# Patient Record
Sex: MALE | Race: WHITE | NOT HISPANIC OR LATINO | Employment: FULL TIME | URBAN - METROPOLITAN AREA
[De-identification: names, ages, dates, MRNs, and addresses within clinical notes are randomized per-mention and may not be internally consistent; named-entity substitution may affect disease eponyms.]

---

## 2018-07-13 ENCOUNTER — OFFICE VISIT (OUTPATIENT)
Dept: INTERNAL MEDICINE | Facility: CLINIC | Age: 33
End: 2018-07-13
Payer: COMMERCIAL

## 2018-07-13 ENCOUNTER — LAB VISIT (OUTPATIENT)
Dept: LAB | Facility: HOSPITAL | Age: 33
End: 2018-07-13
Attending: INTERNAL MEDICINE
Payer: COMMERCIAL

## 2018-07-13 VITALS
SYSTOLIC BLOOD PRESSURE: 108 MMHG | RESPIRATION RATE: 16 BRPM | WEIGHT: 232.13 LBS | DIASTOLIC BLOOD PRESSURE: 79 MMHG | TEMPERATURE: 99 F | HEIGHT: 68 IN | HEART RATE: 113 BPM | BODY MASS INDEX: 35.18 KG/M2

## 2018-07-13 DIAGNOSIS — K21.9 GASTROESOPHAGEAL REFLUX DISEASE, ESOPHAGITIS PRESENCE NOT SPECIFIED: ICD-10-CM

## 2018-07-13 DIAGNOSIS — R11.0 NAUSEA: ICD-10-CM

## 2018-07-13 DIAGNOSIS — K57.30 DIVERTICULOSIS OF LARGE INTESTINE WITHOUT HEMORRHAGE: ICD-10-CM

## 2018-07-13 DIAGNOSIS — F11.90 UNCOMPLICATED OPIOID USE: ICD-10-CM

## 2018-07-13 DIAGNOSIS — K52.9 ACUTE GASTROENTERITIS: Primary | ICD-10-CM

## 2018-07-13 DIAGNOSIS — R51.9 NONINTRACTABLE EPISODIC HEADACHE, UNSPECIFIED HEADACHE TYPE: ICD-10-CM

## 2018-07-13 LAB
ALBUMIN SERPL BCP-MCNC: 3.8 G/DL
ALP SERPL-CCNC: 80 U/L
ALT SERPL W/O P-5'-P-CCNC: 17 U/L
ANION GAP SERPL CALC-SCNC: 10 MMOL/L
ANISOCYTOSIS BLD QL SMEAR: SLIGHT
AST SERPL-CCNC: 12 U/L
BASOPHILS # BLD AUTO: 0.04 K/UL
BASOPHILS NFR BLD: 0.2 %
BILIRUB SERPL-MCNC: 0.7 MG/DL
BUN SERPL-MCNC: 13 MG/DL
CALCIUM SERPL-MCNC: 9.8 MG/DL
CHLORIDE SERPL-SCNC: 99 MMOL/L
CO2 SERPL-SCNC: 26 MMOL/L
CREAT SERPL-MCNC: 1 MG/DL
DIFFERENTIAL METHOD: ABNORMAL
EOSINOPHIL # BLD AUTO: 0 K/UL
EOSINOPHIL NFR BLD: 0.1 %
ERYTHROCYTE [DISTWIDTH] IN BLOOD BY AUTOMATED COUNT: 12.9 %
EST. GFR  (AFRICAN AMERICAN): >60 ML/MIN/1.73 M^2
EST. GFR  (NON AFRICAN AMERICAN): >60 ML/MIN/1.73 M^2
GIANT PLATELETS BLD QL SMEAR: PRESENT
GLUCOSE SERPL-MCNC: 124 MG/DL
HCT VFR BLD AUTO: 37.4 %
HGB BLD-MCNC: 11.9 G/DL
IMM GRANULOCYTES # BLD AUTO: 0.11 K/UL
IMM GRANULOCYTES NFR BLD AUTO: 0.6 %
LYMPHOCYTES # BLD AUTO: 1.7 K/UL
LYMPHOCYTES NFR BLD: 9.1 %
MCH RBC QN AUTO: 27.4 PG
MCHC RBC AUTO-ENTMCNC: 31.8 G/DL
MCV RBC AUTO: 86 FL
MONOCYTES # BLD AUTO: 2.1 K/UL
MONOCYTES NFR BLD: 11.7 %
NEUTROPHILS # BLD AUTO: 14.3 K/UL
NEUTROPHILS NFR BLD: 78.3 %
NRBC BLD-RTO: 0 /100 WBC
OVALOCYTES BLD QL SMEAR: ABNORMAL
PLATELET # BLD AUTO: 186 K/UL
PLATELET BLD QL SMEAR: ABNORMAL
PMV BLD AUTO: 9.8 FL
POIKILOCYTOSIS BLD QL SMEAR: SLIGHT
POLYCHROMASIA BLD QL SMEAR: ABNORMAL
POTASSIUM SERPL-SCNC: 4.3 MMOL/L
PROT SERPL-MCNC: 8 G/DL
RBC # BLD AUTO: 4.35 M/UL
SODIUM SERPL-SCNC: 135 MMOL/L
WBC # BLD AUTO: 18.29 K/UL

## 2018-07-13 PROCEDURE — 36415 COLL VENOUS BLD VENIPUNCTURE: CPT | Mod: PO

## 2018-07-13 PROCEDURE — 80053 COMPREHEN METABOLIC PANEL: CPT

## 2018-07-13 PROCEDURE — 3008F BODY MASS INDEX DOCD: CPT | Mod: CPTII,S$GLB,, | Performed by: INTERNAL MEDICINE

## 2018-07-13 PROCEDURE — 85025 COMPLETE CBC W/AUTO DIFF WBC: CPT

## 2018-07-13 PROCEDURE — 80307 DRUG TEST PRSMV CHEM ANLYZR: CPT

## 2018-07-13 PROCEDURE — 99999 PR PBB SHADOW E&M-EST. PATIENT-LVL III: CPT | Mod: PBBFAC,,, | Performed by: INTERNAL MEDICINE

## 2018-07-13 PROCEDURE — 99214 OFFICE O/P EST MOD 30 MIN: CPT | Mod: S$GLB,,, | Performed by: INTERNAL MEDICINE

## 2018-07-13 RX ORDER — ONDANSETRON 8 MG/1
8 TABLET, ORALLY DISINTEGRATING ORAL EVERY 12 HOURS PRN
Qty: 12 TABLET | Refills: 0 | Status: SHIPPED | OUTPATIENT
Start: 2018-07-13 | End: 2018-08-06

## 2018-07-13 RX ORDER — OMEPRAZOLE 20 MG/1
20 TABLET, DELAYED RELEASE ORAL DAILY
Qty: 30 TABLET | Refills: 0
Start: 2018-07-13 | End: 2019-07-13

## 2018-07-13 NOTE — PROGRESS NOTES
Subjective:       Patient ID: Luca Story is a 32 y.o. male who presents for Headache; Nausea; and Gastroesophageal Reflux  He is present with girlfriend.      Nausea   This is a new problem. The current episode started in the past 7 days (started 3 days ago). The problem occurs constantly. The problem has been gradually improving. Associated symptoms include diaphoresis (night), headaches, nausea and vomiting. Pertinent negatives include no abdominal pain, arthralgias, chest pain, chills, congestion, coughing, fatigue, fever, myalgias, rash, sore throat, swollen glands or weakness. Nothing aggravates the symptoms.   Headache    This is a new problem. The current episode started in the past 7 days. The problem occurs intermittently. The problem has been resolved. The pain is located in the occipital region. The pain does not radiate. The quality of the pain is described as aching. The patient is experiencing no pain. Associated symptoms include nausea and vomiting. Pertinent negatives include no abdominal pain, coughing, dizziness, eye redness, fever, loss of balance, phonophobia, photophobia, rhinorrhea, scalp tenderness, sinus pressure, sore throat, swollen glands, tingling or weakness. Nothing aggravates the symptoms. He has tried oral narcotics for the symptoms. The treatment provided mild relief. There is no history of hypertension or recent head traumas.   Gastroesophageal Reflux   He complains of early satiety, heartburn and nausea. He reports no abdominal pain, no chest pain, no coughing, no dysphagia or no sore throat. This is a recurrent problem. The current episode started more than 1 month ago. The problem occurs frequently. The problem has been gradually worsening. The heartburn wakes him from sleep. Nothing aggravates the symptoms. Pertinent negatives include no fatigue, muscle weakness or orthopnea. He has tried nothing for the symptoms. Past procedures do not include an abdominal ultrasound or  a UGI.     Patient reports history of recurrent diverticulitis and has been treating what he believed was a recurrence with cipro and flagyl. He would take these for a few days and would stop when he felt better. Two of his past episodes were evaluated with CT abdomen in 2012 that showed 7-8cm area of inflammatory changes in the colon with diverticula. Patient does not recall ever having a colonoscopy.    Patient's girlfriend is also concerned that patient may be using the opioids that she found. He reports taking it for flare-ups of diverticulitis but says he last took it 2 weeks ago.      Review of Systems   Constitutional: Positive for appetite change (decreased since onset of symptoms) and diaphoresis (night). Negative for chills, fatigue, fever and unexpected weight change.   HENT: Negative for congestion, rhinorrhea, sinus pressure and sore throat.    Eyes: Negative for photophobia, redness and visual disturbance.   Respiratory: Negative for cough and shortness of breath.    Cardiovascular: Negative for chest pain, palpitations and leg swelling.   Gastrointestinal: Positive for heartburn, nausea and vomiting. Negative for abdominal pain, blood in stool, constipation, diarrhea and dysphagia.   Genitourinary: Negative for decreased urine volume, dysuria and hematuria.   Musculoskeletal: Negative for arthralgias, myalgias and muscle weakness.   Skin: Negative for rash.   Neurological: Positive for headaches. Negative for dizziness, tingling, weakness, light-headedness and loss of balance.   Hematological: Negative for adenopathy.       Objective:      Physical Exam   Constitutional: He is oriented to person, place, and time. He appears well-developed and well-nourished.   HENT:   Head: Normocephalic and atraumatic.   Right Ear: Hearing and external ear normal.   Left Ear: Hearing and external ear normal.   Nose: Nose normal.   Mouth/Throat: Uvula is midline and oropharynx is clear and moist.   Eyes: Conjunctivae,  EOM and lids are normal. Pupils are equal, round, and reactive to light.   Neck: Full passive range of motion without pain.   Cardiovascular: Normal rate and regular rhythm.    No murmur heard.  Pulmonary/Chest: Effort normal and breath sounds normal. He has no wheezes.   Abdominal: Soft. Bowel sounds are normal. He exhibits no distension. There is no tenderness.   Musculoskeletal: Normal range of motion.   Neurological: He is alert and oriented to person, place, and time.   Skin: Skin is warm and dry.   Psychiatric: He has a normal mood and affect.       Assessment:       1. Acute gastroenteritis    2. Gastroesophageal reflux disease, esophagitis presence not specified    3. Diverticulosis of large intestine without hemorrhage    4. Nausea    5. Nonintractable episodic headache, unspecified headache type    6. Uncomplicated opioid use        Plan:       1. Acute gastroenteritis  - trial of zofran as needed for nausea  - bland diet, increase fluid intake, advance diet slowly    2. Gastroesophageal reflux disease, esophagitis presence not specified  - omeprazole (PRILOSEC OTC) 20 MG tablet; Take 1 tablet (20 mg total) by mouth once daily.  Dispense: 30 tablet; Refill: 0  - Ambulatory Referral to Gastroenterology  - CBC auto differential; Future  - Comprehensive metabolic panel; Future    3. Diverticulosis of large intestine without hemorrhage  - Ambulatory Referral to Gastroenterology    4. Nausea  - ondansetron (ZOFRAN-ODT) 8 MG TbDL; Take 1 tablet (8 mg total) by mouth every 12 (twelve) hours as needed.  Dispense: 12 tablet; Refill: 0  - Comprehensive metabolic panel; Future    5. Nonintractable episodic headache, unspecified headache type  - may use tylenol tid as needed for headache    6. Uncomplicated opioid use  - DRUGS OF ABUSE SCREEN, BLOOD; Future    RTC for annual exam    Shaina Walters MD

## 2018-07-16 ENCOUNTER — TELEPHONE (OUTPATIENT)
Dept: GASTROENTEROLOGY | Facility: CLINIC | Age: 33
End: 2018-07-16

## 2018-07-16 ENCOUNTER — LAB VISIT (OUTPATIENT)
Dept: LAB | Facility: HOSPITAL | Age: 33
End: 2018-07-16
Attending: INTERNAL MEDICINE
Payer: COMMERCIAL

## 2018-07-16 ENCOUNTER — TELEPHONE (OUTPATIENT)
Dept: ENDOSCOPY | Facility: HOSPITAL | Age: 33
End: 2018-07-16

## 2018-07-16 ENCOUNTER — OFFICE VISIT (OUTPATIENT)
Dept: GASTROENTEROLOGY | Facility: CLINIC | Age: 33
End: 2018-07-16
Payer: COMMERCIAL

## 2018-07-16 VITALS
WEIGHT: 225.06 LBS | HEART RATE: 101 BPM | SYSTOLIC BLOOD PRESSURE: 115 MMHG | HEIGHT: 68 IN | BODY MASS INDEX: 34.11 KG/M2 | DIASTOLIC BLOOD PRESSURE: 78 MMHG

## 2018-07-16 DIAGNOSIS — R10.32 LLQ PAIN: ICD-10-CM

## 2018-07-16 DIAGNOSIS — D63.8 ANEMIA, CHRONIC DISEASE: ICD-10-CM

## 2018-07-16 DIAGNOSIS — K57.92 DIVERTICULITIS: ICD-10-CM

## 2018-07-16 DIAGNOSIS — Z12.11 SPECIAL SCREENING FOR MALIGNANT NEOPLASMS, COLON: Primary | ICD-10-CM

## 2018-07-16 DIAGNOSIS — K21.9 GASTROESOPHAGEAL REFLUX DISEASE, ESOPHAGITIS PRESENCE NOT SPECIFIED: Primary | ICD-10-CM

## 2018-07-16 PROBLEM — D64.9 ANEMIA: Status: ACTIVE | Noted: 2018-07-16

## 2018-07-16 PROBLEM — D72.829 LEUKOCYTOSIS: Status: ACTIVE | Noted: 2018-07-16

## 2018-07-16 LAB
AMPHETAMINES SERPL QL: NEGATIVE
BARBITURATES SERPL QL SCN: NEGATIVE
BENZODIAZ SERPL QL: NEGATIVE
CANNABINOIDS SERPL QL: POSITIVE
COCAINE, BLOOD: NEGATIVE
CRP SERPL-MCNC: 348.2 MG/L
ETHANOL SERPL-MCNC: NEGATIVE MG/DL
FERRITIN SERPL-MCNC: 607 NG/ML
IGA SERPL-MCNC: 180 MG/DL
IGG SERPL-MCNC: 979 MG/DL
IGM SERPL-MCNC: 82 MG/DL
IRON SERPL-MCNC: 12 UG/DL
METHADONE SERPL QL SCN: NEGATIVE
OPIATES SERPL QL SCN: NEGATIVE
PCP SERPL QL SCN: NEGATIVE
PROPOXYPH SERPL QL: NEGATIVE
SATURATED IRON: 4 %
TOTAL IRON BINDING CAPACITY: 296 UG/DL
TRANSFERRIN SERPL-MCNC: 200 MG/DL

## 2018-07-16 PROCEDURE — 83540 ASSAY OF IRON: CPT

## 2018-07-16 PROCEDURE — 99204 OFFICE O/P NEW MOD 45 MIN: CPT | Mod: S$GLB,,, | Performed by: INTERNAL MEDICINE

## 2018-07-16 PROCEDURE — 83516 IMMUNOASSAY NONANTIBODY: CPT

## 2018-07-16 PROCEDURE — 99999 PR PBB SHADOW E&M-EST. PATIENT-LVL III: CPT | Mod: PBBFAC,,, | Performed by: INTERNAL MEDICINE

## 2018-07-16 PROCEDURE — 3008F BODY MASS INDEX DOCD: CPT | Mod: CPTII,S$GLB,, | Performed by: INTERNAL MEDICINE

## 2018-07-16 PROCEDURE — 86140 C-REACTIVE PROTEIN: CPT

## 2018-07-16 PROCEDURE — 82784 ASSAY IGA/IGD/IGG/IGM EACH: CPT

## 2018-07-16 PROCEDURE — 82728 ASSAY OF FERRITIN: CPT

## 2018-07-16 RX ORDER — POLYETHYLENE GLYCOL 3350, SODIUM SULFATE ANHYDROUS, SODIUM BICARBONATE, SODIUM CHLORIDE, POTASSIUM CHLORIDE 236; 22.74; 6.74; 5.86; 2.97 G/4L; G/4L; G/4L; G/4L; G/4L
4 POWDER, FOR SOLUTION ORAL ONCE
Qty: 4000 ML | Refills: 0 | Status: SHIPPED | OUTPATIENT
Start: 2018-07-16 | End: 2018-07-16

## 2018-07-16 NOTE — PROGRESS NOTES
Subjective:       Patient ID: Luca Story is a 32 y.o. male.    Chief Complaint: GI Problem    HPI  Review of Systems   Constitutional: Negative for activity change, appetite change, chills, diaphoresis, fatigue, fever and unexpected weight change.   HENT: Positive for voice change. Negative for congestion, ear pain, mouth sores, rhinorrhea, sinus pressure, sneezing, sore throat and trouble swallowing.    Eyes: Negative for pain.   Respiratory: Negative for cough and shortness of breath.    Cardiovascular: Negative for chest pain, palpitations and leg swelling.   Genitourinary: Negative for difficulty urinating and flank pain.   Musculoskeletal: Positive for myalgias. Negative for arthralgias, back pain, gait problem, joint swelling and neck pain.   Skin: Negative for rash.   Neurological: Negative for dizziness, tremors, syncope, numbness and headaches.   Hematological: Negative for adenopathy. Does not bruise/bleed easily.   Psychiatric/Behavioral: Negative for agitation, behavioral problems, confusion, decreased concentration and dysphoric mood.       Objective:      Physical Exam   Constitutional: He is oriented to person, place, and time. He appears well-developed and well-nourished. No distress.   HENT:   Right Ear: External ear normal.   Left Ear: External ear normal.   Nose: Nose normal.   Mouth/Throat: Oropharynx is clear and moist. No oropharyngeal exudate.   Eyes: Conjunctivae are normal. Pupils are equal, round, and reactive to light. No scleral icterus.   Neck: Normal range of motion. Neck supple. No thyromegaly present.   Cardiovascular: Normal rate, normal heart sounds and intact distal pulses.  Exam reveals no gallop.    No murmur heard.  Pulmonary/Chest: Effort normal and breath sounds normal. He has no wheezes.   Abdominal: Soft. Normal appearance and bowel sounds are normal. He exhibits no distension, no fluid wave, no ascites and no mass. There is no hepatosplenomegaly. There is no  "tenderness. There is no rigidity, no rebound, no guarding and no CVA tenderness.   Genitourinary:   Genitourinary Comments: recent   Musculoskeletal: Normal range of motion. He exhibits no edema or tenderness.   Lymphadenopathy:     He has no cervical adenopathy.   Neurological: He is alert and oriented to person, place, and time. He has normal reflexes. No cranial nerve deficit.   Skin: Skin is warm. No rash noted. He is not diaphoretic.   Psychiatric: He has a normal mood and affect. His behavior is normal. Thought content normal.       Assessment:       1. Gastroesophageal reflux disease, esophagitis presence not specified    2. Diverticulitis    3. Anemia, chronic disease        Plan:         HISTORY OF PRESENT ILLNESS:  This is a new patient visit for [facility].  He is here   with his girlfriend.  He has a complicated history.  He had an initial   hospitalization for diverticulitis in 2012.  He would have been about 25 or 26   years of age at that time.  He did have a microperforation.  He required 4 days   in the hospital.  He was treated with antibiotics.  He had another recurrence   later on that year, was treated as an outpatient with antibiotics.  Since that   time, he has had occasional "flare-ups."  This might happen 1 to 2 times a year.    He will experience low mid abdominal pain reminiscent of the diverticulitis.    He will take 2 days of the Cipro and Flagyl that are left over and then the   symptoms will resolve.  His bowel movements are usually normal.  Normal for Hector   is 2 to 3 soft formed stools per day.  He denies any melena or hematochezia.    He also reports "terrible" acid reflux.  This has been present since childhood.    He describes both pyrosis and regurgitation.  He particularly has problems with   nocturnal regurgitation.  He denies any dysphagia.  Prilosec helps when he   takes it and Tums gives him partial and temporary relief as well.    Recently, he had some lab work that showed a " leukocytosis with a WBC of 18 and   an anemia with normocytic hemoglobin of 11.9.    ASSESSMENT AND DECISION MAKIN.  Gastroesophageal reflux.  He has chronic longstanding symptoms.  I   emphasized the importance of compliance with the Prilosec.  I have recommended   we do an EGD to assess for any sign of esophageal damage.  2.  History of diverticulitis.  He has had 1 documented episode of   diverticulitis, seems like he has other flares of disease.  With his anemia, I   think it is very important for us to do a colonoscopy now to make sure there is   no IBD.  3.  Anemia, etiology of this is unclear.  I recommended some additional work to   include CRP, ferritin, ____, TIBC and TTG.    RECOMMENDATIONS:  1.  Labs.  2.  Set up EGD and colon.  3.  Take Prilosec everyday, take from there.      CONNOR/IN  dd: 2018 09:03:10 (CDT)  td: 2018 20:23:11 (CDT)  Doc ID   #[id number]  Job ID #[id number]    CC:

## 2018-07-16 NOTE — TELEPHONE ENCOUNTER
Patient aware and expressed understanding.  Scheduled for CT on Thursday 7/19 for 4:45.  Verbal instructions given to patient and he expressed understanding.

## 2018-07-16 NOTE — TELEPHONE ENCOUNTER
----- Message from Maria Luisa Easley sent at 7/16/2018  4:03 PM CDT -----  Contact: Self- [phone]  Jose- pt is returning a missed call from Diana in regards lab results and scheduling a CT scan- please contact pt at [phone]

## 2018-07-16 NOTE — TELEPHONE ENCOUNTER
----- Message from Ra Garcia MD sent at 7/16/2018 11:09 AM CDT -----  Please call pt, his inflammation test is really  High  Before we do the scope, I want to get a CT scan to make sure no diverticulitis going on now

## 2018-07-16 NOTE — LETTER
July 16, 2018      Shaina Walters MD  [address] [place] [address] [place] [address]           [facility]  [address]  [facility] [address]  Phone: [phone]  Fax: [phone]          Patient: Luca Story   MR Number: [id number]   YOB: 1985   Date of Visit: 7/16/2018       Dear Dr. Shaina Walters:    Thank you for referring Luca Story to me for evaluation. Attached you will find relevant portions of my assessment and plan of care.    If you have questions, please do not hesitate to call me. I look forward to following Luca Story along with you.    Sincerely,    Ra Garcia MD    Enclosure  CC:  No Recipients    If you would like to receive this communication electronically, please contact [email or web address] or [phone] to request more information on [facility] Link access.    For providers and/or their staff who would like to refer a patient to [facility], please contact us through our one-stop-shop provider referral line, [facility], at [phone].    If you feel you have received this communication in error or would no longer like to receive these types of communications, please e-mail [email or web address]

## 2018-07-16 NOTE — TELEPHONE ENCOUNTER
----- Message from Yolie Butler sent at 7/16/2018  4:29 PM CDT -----  Contact: self - [phone]  Jose - returning your call - please call patient at

## 2018-07-17 LAB — TTG IGA SER IA-ACNC: 6 UNITS

## 2018-07-19 ENCOUNTER — HOSPITAL ENCOUNTER (OUTPATIENT)
Dept: RADIOLOGY | Facility: HOSPITAL | Age: 33
Discharge: HOME | End: 2018-07-19
Attending: INTERNAL MEDICINE
Payer: COMMERCIAL

## 2018-07-19 ENCOUNTER — HOSPITAL ENCOUNTER (EMERGENCY)
Facility: HOSPITAL | Age: 33
Discharge: HOME | End: 2018-07-19
Attending: EMERGENCY MEDICINE
Payer: COMMERCIAL

## 2018-07-19 VITALS
SYSTOLIC BLOOD PRESSURE: 126 MMHG | RESPIRATION RATE: 18 BRPM | WEIGHT: 224.88 LBS | TEMPERATURE: 100 F | OXYGEN SATURATION: 98 % | HEIGHT: 68 IN | BODY MASS INDEX: 34.08 KG/M2 | DIASTOLIC BLOOD PRESSURE: 68 MMHG | HEART RATE: 105 BPM

## 2018-07-19 DIAGNOSIS — G44.209 TENSION HEADACHE: Primary | ICD-10-CM

## 2018-07-19 DIAGNOSIS — R10.32 LLQ PAIN: ICD-10-CM

## 2018-07-19 DIAGNOSIS — D72.821 MONOCYTOSIS: ICD-10-CM

## 2018-07-19 LAB
ALBUMIN SERPL BCP-MCNC: 2.6 G/DL
ALP SERPL-CCNC: 147 U/L
ALT SERPL W/O P-5'-P-CCNC: 61 U/L
ANION GAP SERPL CALC-SCNC: 12 MMOL/L
ANISOCYTOSIS BLD QL SMEAR: SLIGHT
AST SERPL-CCNC: 34 U/L
BASOPHILS # BLD AUTO: 0.04 K/UL
BASOPHILS NFR BLD: 0.3 %
BILIRUB SERPL-MCNC: 0.5 MG/DL
BUN SERPL-MCNC: 13 MG/DL
CALCIUM SERPL-MCNC: 9.3 MG/DL
CHLORIDE SERPL-SCNC: 99 MMOL/L
CO2 SERPL-SCNC: 25 MMOL/L
CREAT SERPL-MCNC: 0.8 MG/DL
DIFFERENTIAL METHOD: ABNORMAL
EOSINOPHIL # BLD AUTO: 0.2 K/UL
EOSINOPHIL NFR BLD: 1 %
ERYTHROCYTE [DISTWIDTH] IN BLOOD BY AUTOMATED COUNT: 13.9 %
EST. GFR  (AFRICAN AMERICAN): >60 ML/MIN/1.73 M^2
EST. GFR  (NON AFRICAN AMERICAN): >60 ML/MIN/1.73 M^2
GLUCOSE SERPL-MCNC: 100 MG/DL
HCT VFR BLD AUTO: 35.8 %
HGB BLD-MCNC: 11.6 G/DL
IMM GRANULOCYTES # BLD AUTO: 0.09 K/UL
IMM GRANULOCYTES NFR BLD AUTO: 0.6 %
LIPASE SERPL-CCNC: 15 U/L
LYMPHOCYTES # BLD AUTO: 1.5 K/UL
LYMPHOCYTES NFR BLD: 9.6 %
MCH RBC QN AUTO: 27.6 PG
MCHC RBC AUTO-ENTMCNC: 32.4 G/DL
MCV RBC AUTO: 85 FL
MONOCYTES # BLD AUTO: 3.8 K/UL
MONOCYTES NFR BLD: 25 %
NEUTROPHILS # BLD AUTO: 9.7 K/UL
NEUTROPHILS NFR BLD: 63.5 %
NRBC BLD-RTO: 0 /100 WBC
PLATELET # BLD AUTO: 335 K/UL
PLATELET BLD QL SMEAR: ABNORMAL
PMV BLD AUTO: 10.8 FL
POTASSIUM SERPL-SCNC: 4.2 MMOL/L
PROT SERPL-MCNC: 7.3 G/DL
RBC # BLD AUTO: 4.21 M/UL
SODIUM SERPL-SCNC: 136 MMOL/L
WBC # BLD AUTO: 15.24 K/UL

## 2018-07-19 PROCEDURE — 99284 EMERGENCY DEPT VISIT MOD MDM: CPT | Mod: 25

## 2018-07-19 PROCEDURE — 63600175 PHARM REV CODE 636 W HCPCS: Performed by: EMERGENCY MEDICINE

## 2018-07-19 PROCEDURE — 96375 TX/PRO/DX INJ NEW DRUG ADDON: CPT

## 2018-07-19 PROCEDURE — 25000003 PHARM REV CODE 250: Performed by: EMERGENCY MEDICINE

## 2018-07-19 PROCEDURE — 96374 THER/PROPH/DIAG INJ IV PUSH: CPT

## 2018-07-19 PROCEDURE — 80053 COMPREHEN METABOLIC PANEL: CPT

## 2018-07-19 PROCEDURE — 96372 THER/PROPH/DIAG INJ SC/IM: CPT | Mod: 59

## 2018-07-19 PROCEDURE — 85025 COMPLETE CBC W/AUTO DIFF WBC: CPT

## 2018-07-19 PROCEDURE — 25500020 PHARM REV CODE 255: Performed by: EMERGENCY MEDICINE

## 2018-07-19 PROCEDURE — 99284 EMERGENCY DEPT VISIT MOD MDM: CPT | Mod: ,,, | Performed by: EMERGENCY MEDICINE

## 2018-07-19 PROCEDURE — 83690 ASSAY OF LIPASE: CPT

## 2018-07-19 PROCEDURE — 96361 HYDRATE IV INFUSION ADD-ON: CPT

## 2018-07-19 RX ORDER — TRIAMCINOLONE ACETONIDE 40 MG/ML
80 INJECTION, SUSPENSION INTRA-ARTICULAR; INTRAMUSCULAR
Status: COMPLETED | OUTPATIENT
Start: 2018-07-19 | End: 2018-07-19

## 2018-07-19 RX ORDER — KETOROLAC TROMETHAMINE 30 MG/ML
15 INJECTION, SOLUTION INTRAMUSCULAR; INTRAVENOUS
Status: COMPLETED | OUTPATIENT
Start: 2018-07-19 | End: 2018-07-19

## 2018-07-19 RX ORDER — METHOCARBAMOL 500 MG/1
1000 TABLET, FILM COATED ORAL 3 TIMES DAILY
Qty: 31 TABLET | Refills: 0 | Status: SHIPPED | OUTPATIENT
Start: 2018-07-19 | End: 2018-07-24

## 2018-07-19 RX ORDER — ORPHENADRINE CITRATE 30 MG/ML
60 INJECTION INTRAMUSCULAR; INTRAVENOUS
Status: COMPLETED | OUTPATIENT
Start: 2018-07-19 | End: 2018-07-19

## 2018-07-19 RX ADMIN — TRIAMCINOLONE ACETONIDE 80 MG: 40 INJECTION, SUSPENSION INTRA-ARTICULAR; INTRAMUSCULAR at 11:07

## 2018-07-19 RX ADMIN — SODIUM CHLORIDE, POTASSIUM CHLORIDE, SODIUM LACTATE AND CALCIUM CHLORIDE 1000 ML: 600; 310; 30; 20 INJECTION, SOLUTION INTRAVENOUS at 07:07

## 2018-07-19 RX ADMIN — IOHEXOL 100 ML: 350 INJECTION, SOLUTION INTRAVENOUS at 09:07

## 2018-07-19 RX ADMIN — ORPHENADRINE CITRATE 60 MG: 30 INJECTION INTRAMUSCULAR; INTRAVENOUS at 07:07

## 2018-07-19 RX ADMIN — KETOROLAC TROMETHAMINE 15 MG: 30 INJECTION, SOLUTION INTRAMUSCULAR at 07:07

## 2018-07-19 NOTE — ED PROVIDER NOTES
Encounter Date: 7/19/2018    SCRIBE #1 NOTE: I, Anthony Zamora, am scribing for, and in the presence of,  Dr. Ceja. I have scribed the entire note.       History     Chief Complaint   Patient presents with    Abdominal Pain     sent after couldnt get CT scan with contrast to r/o diverticulitis. Swelling to bilateral feet and right hand. C/o neck pain causing headache     Time patient was seen by the provider: 6:21 PM      The patient is a 32 y.o. male with hx of: Diverticulitis, GERD, and Anemia who presents to the ED after clinic could not obtain CT scan abd/pelvis with IV contract to r/o diverticulitis. Pt denies any abd pain or current nausea. He does complain of decreased PO intake, dry mouth, swelling to bilateral feet and hands (worse on right) that began yesterday, right hand and wrist pain, right foot and ankle pain, tension-like occipital headache x1 week, chills, and neck pain. Pt denies any abd pain, knee pain, elbow pain, recent falls, melena, bloody stools, abnormal bowel movements, difficulty swallowing, hx of extremity swelling in the past, hx of migraines, or FHX of Crohn's or UC. Pt states his headache is relieved with ibuprofen. Pt reports feeling dehydrated. He also mentions that he has a high WBC count and anemia.      The history is provided by the patient and medical records.     Review of patient's allergies indicates:  No Known Allergies  Past Medical History:   Diagnosis Date    Anemia     Diverticulitis     Diverticulitis     GERD (gastroesophageal reflux disease)      Past Surgical History:   Procedure Laterality Date    KNEE SURGERY      x2     Family History   Problem Relation Age of Onset    Crohn's disease Neg Hx     Ulcerative colitis Neg Hx     Celiac disease Neg Hx      Social History   Substance Use Topics    Smoking status: Never Smoker    Smokeless tobacco: Never Used    Alcohol use No     Review of Systems   Constitutional: Positive for appetite change (decreased PO  intake) and chills. Negative for fever.   HENT: Negative for sore throat and trouble swallowing.    Respiratory: Negative for shortness of breath.    Cardiovascular: Negative for chest pain.   Gastrointestinal: Negative for abdominal pain, blood in stool, constipation, diarrhea, nausea and vomiting.        - melena   Genitourinary: Negative for dysuria.   Musculoskeletal: Positive for neck pain. Negative for back pain.        + swelling to bilateral hands and feet (worse on right)  + right hand and wrist pain  + right foot and ankle pain  - knee pain  - elbow pain   Skin: Negative for rash.   Neurological: Positive for headaches (occipital; tension-like). Negative for weakness.   Hematological: Does not bruise/bleed easily.       Physical Exam     Initial Vitals [07/19/18 1749]   BP Pulse Resp Temp SpO2   118/68 109 18 100.1 °F (37.8 °C) 98 %      MAP       --         Physical Exam    Vitals reviewed.  Constitutional:   33 yo  man.   Mild discomfort noted.    HENT:   Head: Normocephalic and atraumatic.   Mildly anhydrous mucous membranes noted.    Eyes: EOM are normal. Pupils are equal, round, and reactive to light.   Mild conjunctival erythema noted bilaterally.    Neck: Normal range of motion. Neck supple. No tracheal deviation present. No JVD present.   Mild to moderate cervical paraspinous tenderness and spasm without midline tenderness or deformity.    Cardiovascular: Normal rate, regular rhythm, normal heart sounds and intact distal pulses.   Pulmonary/Chest: Breath sounds normal. No stridor. No respiratory distress.   Abdominal: Soft. He exhibits no distension. There is tenderness.   Obese.   Minimal tenderness noted to LLQ.   Musculoskeletal: Normal range of motion. He exhibits edema and tenderness.   Mild nonpitting edema noted to right foot and right hand with associated tenderness to right hand, right wrist, right foot, and right ankle without associated deformity or evidence of trauma.     Neurological: He is alert and oriented to person, place, and time.   Psychiatric: His behavior is normal. Thought content normal.         ED Course   Procedures  Labs Reviewed   CBC W/ AUTO DIFFERENTIAL - Abnormal; Notable for the following:        Result Value    WBC 15.24 (*)     RBC 4.21 (*)     Hemoglobin 11.6 (*)     Hematocrit 35.8 (*)     Immature Granulocytes 0.6 (*)     Gran # (ANC) 9.7 (*)     Immature Grans (Abs) 0.09 (*)     Mono # 3.8 (*)     Lymph% 9.6 (*)     Mono% 25.0 (*)     All other components within normal limits   COMPREHENSIVE METABOLIC PANEL - Abnormal; Notable for the following:     Albumin 2.6 (*)     Alkaline Phosphatase 147 (*)     ALT 61 (*)     All other components within normal limits   LIPASE          Imaging Results          CT Abdomen Pelvis With Contrast (Final result)  Result time 07/19/18 22:53:42    Final result by Aki Sunshine MD (07/19/18 22:53:42)                 Impression:      No acute abdominal or pelvic pathology, specifically noting no evidence of acute diverticulitis in this patient with sigmoid diverticulosis.    Hepatosplenomegaly.    Additional findings as above.    Electronically signed by resident: Adi Mariscal  Date:    07/19/2018  Time:    22:04    Electronically signed by: Aki Sunshine MD  Date:    07/19/2018  Time:    22:53             Narrative:    EXAMINATION:  CT ABDOMEN PELVIS WITH CONTRAST    CLINICAL HISTORY:  LLQ pain, suspect diverticulitis;    TECHNIQUE:  Low dose axial images, sagittal and coronal reformations were obtained from the lung bases to the pubic symphysis following the IV administration of 100 mL of Omnipaque 350 and the oral administration of Omnipaque 350.    COMPARISON:  CT abdomen/pelvis without contrast 05/14/2012    FINDINGS:  ABDOMEN/LOWER THORAX:    - Visualized heart: No cardiomegaly. No significant pericardial effusion.    - Lung bases/pleura: Minimal left basilar atelectasis.  No focal consolidation, mass, or  pneumothorax.  No significant pleural fluid.    - Liver: Enlarged in size measuring up to 21.0 cm, homogeneous in attenuation, and without focal lesion.    - Gallbladder: No calcified gallstones.    - Bile Ducts: No evidence of intra or extra hepatic biliary ductal dilation.    - Spleen: Enlarged measuring up to 15.0 cm.  No discrete lesion.    - Kidneys: Early bilateral nephrograms.  Normal morphology without evidence of mass or hydronephrosis.  No nephrolithiasis.  Ureters and bladder demonstrate no significant abnormality.    - Adrenals: Within normal limits.    - Pancreas: No mass or peripancreatic fat stranding.    - Retroperitoneum:  No significant adenopathy.    - Vascular: No abdominal aortic aneurysm.  No significant atherosclerosis.    - Abdominal wall:  No significant abnormality.    PELVIS:    No pelvic mass, adenopathy, or free fluid.    BOWEL/MESENTERY/PERITONEUM:    The stomach demonstrates no significant abnormality.  Contrast is visualized in the distal small bowel and proximal large bowel.  No evidence of bowel obstruction or inflammation.  Numerous sigmoid colon diverticula without evidence of significant adjacent inflammatory change to suggest acute diverticulitis.  Appendix is unremarkable.  Note is made of multiple normal sized mesenteric lymph nodes in the right lower quadrant.  No significant mesenteric edema.  No free air or ascites.    BONES: No acute fracture or aggressive osseous lesions.                                 Medical Decision Making:   History:   Old Medical Records: I decided to obtain old medical records.  Differential Diagnosis:   Ddx includes but is not limited to: tension headache, diverticulitis, autoimmune disorder, Crohn's flare.   Clinical Tests:   Lab Tests: Ordered and Reviewed  Radiological Study: Ordered and Reviewed            Scribe Attestation:   Scribe #1: I performed the above scribed service and the documentation accurately describes the services I performed.  I attest to the accuracy of the note.    Attending Attestation:             Attending ED Notes:   Emergency department evaluation today reveals an interval decrease in his total WBC albeit with a monocytosis noted today in this patient presenting without richie fever and joint pains of his wrist and ankle.  Patient reports resolution of his neck pain with ED therapy.  Metabolic profile is largely within normal limits with a moderately diminished albumin and a mild elevation of the ALT and alk-phos.  CT scan with IV contrast has been obtained and does not reveal evidence of acute intra-abdominal pathology.  The patient will be discharged home after receiving a parental dose of Kenalog which I believe will improve the inflammation of his tension headache as well as the nonspecific inflammation of his wrist and ankle.  He will be discharged home in improved condition with prescription for Robaxin to be taken as needed for recurrence neck pain.  Indications to return such as fever intractable vomiting, or other urgent concerns have been discussed and all questions have been answered to the patient and accompanying spouse is satisfaction.             Clinical Impression:   The primary encounter diagnosis was Tension headache. A diagnosis of Monocytosis was also pertinent to this visit.      Disposition:   Disposition: Discharged  Condition: Stable                        Jovany Ceja MD  07/19/18 3991

## 2018-07-19 NOTE — ED NOTES
Patient identifiers verified and correct for Mr Story  C/C:Mult concerns, abd pain , neck pain, headache, swelling in legs,  vomiting, unable to get CT scan due to IV access  APPEARANCE: awake and alert in NAD.  SKIN: warm, dry and intact. No breakdown or bruising.  MUSCULOSKELETAL: Patient moving all extremities spontaneously, 1-2+ pedal edema Ambulates independently.  RESPIRATORY: Denies shortness of breath.Respirations unlabored. Denies fever  CARDIAC: Denies CP, 2+ distal pulses; 1-2 peripheral edema  ABDOMEN: Abdomen soft, positive nausea, emesis, BM today, denies black stools.   : voids spontaneously, denies difficulty  Neurologic: AAO x 4; follows commands equal strength in all extremities; denies numbness/tingling. Denies dizziness Positive weakness positive neck pain, positive headache x 1 week, pain to right wrist, right ankle

## 2018-07-19 NOTE — ED TRIAGE NOTES
Patient states last week, Thursday, states he relapsed with pain meds and Suboxone States vomiting x 4 days, went to PCP, rx Zofran. Able to drink small amounts of water. Yesterday began with swelling in feet, pain to right foot and right  hand. Scheduled for CT scan per GI scheduled for colonoscopy and EGD on August 1st. Unable to get CT scan today due to inability to obtain IV access.

## 2018-07-27 ENCOUNTER — HOSPITAL ENCOUNTER (EMERGENCY)
Facility: HOSPITAL | Age: 33
Discharge: HOME | End: 2018-07-27
Attending: EMERGENCY MEDICINE
Payer: COMMERCIAL

## 2018-07-27 ENCOUNTER — OFFICE VISIT (OUTPATIENT)
Dept: INTERNAL MEDICINE | Facility: CLINIC | Age: 33
End: 2018-07-27
Payer: COMMERCIAL

## 2018-07-27 VITALS
OXYGEN SATURATION: 97 % | TEMPERATURE: 99 F | HEART RATE: 84 BPM | RESPIRATION RATE: 16 BRPM | HEIGHT: 68 IN | SYSTOLIC BLOOD PRESSURE: 111 MMHG | BODY MASS INDEX: 32.64 KG/M2 | DIASTOLIC BLOOD PRESSURE: 58 MMHG | WEIGHT: 215.38 LBS

## 2018-07-27 VITALS
HEIGHT: 68 IN | OXYGEN SATURATION: 99 % | TEMPERATURE: 100 F | HEART RATE: 101 BPM | DIASTOLIC BLOOD PRESSURE: 71 MMHG | BODY MASS INDEX: 34.08 KG/M2 | WEIGHT: 224.88 LBS | RESPIRATION RATE: 16 BRPM | SYSTOLIC BLOOD PRESSURE: 105 MMHG

## 2018-07-27 DIAGNOSIS — M79.672 FOOT PAIN, BILATERAL: ICD-10-CM

## 2018-07-27 DIAGNOSIS — E86.0 DEHYDRATION: ICD-10-CM

## 2018-07-27 DIAGNOSIS — J01.10 ACUTE NON-RECURRENT FRONTAL SINUSITIS: Primary | ICD-10-CM

## 2018-07-27 DIAGNOSIS — M79.671 FOOT PAIN, BILATERAL: ICD-10-CM

## 2018-07-27 DIAGNOSIS — R42 DIZZINESS: Primary | ICD-10-CM

## 2018-07-27 DIAGNOSIS — R42 VERTIGO: ICD-10-CM

## 2018-07-27 DIAGNOSIS — R11.2 NAUSEA AND VOMITING, INTRACTABILITY OF VOMITING NOT SPECIFIED, UNSPECIFIED VOMITING TYPE: ICD-10-CM

## 2018-07-27 DIAGNOSIS — M79.89 SWELLING OF EXTREMITY: ICD-10-CM

## 2018-07-27 DIAGNOSIS — R53.1 GENERALIZED WEAKNESS: ICD-10-CM

## 2018-07-27 LAB
ALBUMIN SERPL BCP-MCNC: 2.6 G/DL
ALP SERPL-CCNC: 150 U/L
ALT SERPL W/O P-5'-P-CCNC: 43 U/L
ANION GAP SERPL CALC-SCNC: 10 MMOL/L
ANISOCYTOSIS BLD QL SMEAR: SLIGHT
AST SERPL-CCNC: 23 U/L
BASOPHILS # BLD AUTO: 0.07 K/UL
BASOPHILS NFR BLD: 0.4 %
BILIRUB SERPL-MCNC: 0.4 MG/DL
BUN SERPL-MCNC: 14 MG/DL
CALCIUM SERPL-MCNC: 9.8 MG/DL
CHLORIDE SERPL-SCNC: 99 MMOL/L
CO2 SERPL-SCNC: 26 MMOL/L
CREAT SERPL-MCNC: 0.8 MG/DL
DIFFERENTIAL METHOD: ABNORMAL
EOSINOPHIL # BLD AUTO: 0 K/UL
EOSINOPHIL NFR BLD: 0.1 %
ERYTHROCYTE [DISTWIDTH] IN BLOOD BY AUTOMATED COUNT: 14 %
ERYTHROCYTE [SEDIMENTATION RATE] IN BLOOD BY WESTERGREN METHOD: 125 MM/HR
EST. GFR  (AFRICAN AMERICAN): >60 ML/MIN/1.73 M^2
EST. GFR  (NON AFRICAN AMERICAN): >60 ML/MIN/1.73 M^2
GLUCOSE SERPL-MCNC: 112 MG/DL
GLUCOSE SERPL-MCNC: 122 MG/DL (ref 70–110)
HCT VFR BLD AUTO: 31.7 %
HGB BLD-MCNC: 10.6 G/DL
IMM GRANULOCYTES # BLD AUTO: 0.53 K/UL
IMM GRANULOCYTES NFR BLD AUTO: 2.8 %
LYMPHOCYTES # BLD AUTO: 2 K/UL
LYMPHOCYTES NFR BLD: 10.7 %
MCH RBC QN AUTO: 27.8 PG
MCHC RBC AUTO-ENTMCNC: 33.4 G/DL
MCV RBC AUTO: 83 FL
MONOCYTES # BLD AUTO: 3.2 K/UL
MONOCYTES NFR BLD: 17.1 %
NEUTROPHILS # BLD AUTO: 13 K/UL
NEUTROPHILS NFR BLD: 68.9 %
NRBC BLD-RTO: 0 /100 WBC
PLATELET # BLD AUTO: 554 K/UL
PLATELET BLD QL SMEAR: ABNORMAL
PMV BLD AUTO: 9.2 FL
POTASSIUM SERPL-SCNC: 4.4 MMOL/L
PROT SERPL-MCNC: 8.4 G/DL
RBC # BLD AUTO: 3.81 M/UL
RHEUMATOID FACT SERPL-ACNC: <10 IU/ML
SODIUM SERPL-SCNC: 135 MMOL/L
WBC # BLD AUTO: 18.9 K/UL

## 2018-07-27 PROCEDURE — 82948 REAGENT STRIP/BLOOD GLUCOSE: CPT | Mod: S$GLB,,, | Performed by: INTERNAL MEDICINE

## 2018-07-27 PROCEDURE — 85651 RBC SED RATE NONAUTOMATED: CPT

## 2018-07-27 PROCEDURE — 63600175 PHARM REV CODE 636 W HCPCS: Performed by: EMERGENCY MEDICINE

## 2018-07-27 PROCEDURE — 96361 HYDRATE IV INFUSION ADD-ON: CPT

## 2018-07-27 PROCEDURE — 99999 PR PBB SHADOW E&M-EST. PATIENT-LVL IV: CPT | Mod: PBBFAC,,, | Performed by: INTERNAL MEDICINE

## 2018-07-27 PROCEDURE — 3008F BODY MASS INDEX DOCD: CPT | Mod: CPTII,S$GLB,, | Performed by: INTERNAL MEDICINE

## 2018-07-27 PROCEDURE — 99284 EMERGENCY DEPT VISIT MOD MDM: CPT | Mod: ,,, | Performed by: EMERGENCY MEDICINE

## 2018-07-27 PROCEDURE — S0119 ONDANSETRON 4 MG: HCPCS | Mod: S$GLB,,, | Performed by: INTERNAL MEDICINE

## 2018-07-27 PROCEDURE — 99284 EMERGENCY DEPT VISIT MOD MDM: CPT | Mod: 25

## 2018-07-27 PROCEDURE — 96374 THER/PROPH/DIAG INJ IV PUSH: CPT

## 2018-07-27 PROCEDURE — 96375 TX/PRO/DX INJ NEW DRUG ADDON: CPT

## 2018-07-27 PROCEDURE — 85025 COMPLETE CBC W/AUTO DIFF WBC: CPT

## 2018-07-27 PROCEDURE — 99214 OFFICE O/P EST MOD 30 MIN: CPT | Mod: 25,S$GLB,, | Performed by: INTERNAL MEDICINE

## 2018-07-27 PROCEDURE — 80053 COMPREHEN METABOLIC PANEL: CPT

## 2018-07-27 PROCEDURE — 86431 RHEUMATOID FACTOR QUANT: CPT

## 2018-07-27 PROCEDURE — 25000003 PHARM REV CODE 250: Performed by: EMERGENCY MEDICINE

## 2018-07-27 RX ORDER — DEXAMETHASONE SODIUM PHOSPHATE 4 MG/ML
4 INJECTION, SOLUTION INTRA-ARTICULAR; INTRALESIONAL; INTRAMUSCULAR; INTRAVENOUS; SOFT TISSUE
Status: COMPLETED | OUTPATIENT
Start: 2018-07-27 | End: 2018-07-27

## 2018-07-27 RX ORDER — MECLIZINE HYDROCHLORIDE 25 MG/1
25 TABLET ORAL 3 TIMES DAILY PRN
Qty: 20 TABLET | Refills: 0 | Status: SHIPPED | OUTPATIENT
Start: 2018-07-27 | End: 2018-07-27

## 2018-07-27 RX ORDER — SODIUM CHLORIDE 9 MG/ML
500 INJECTION, SOLUTION INTRAVENOUS
Status: COMPLETED | OUTPATIENT
Start: 2018-07-27 | End: 2018-07-27

## 2018-07-27 RX ORDER — ONDANSETRON 4 MG/1
4 TABLET, ORALLY DISINTEGRATING ORAL
Status: COMPLETED | OUTPATIENT
Start: 2018-07-27 | End: 2018-07-27

## 2018-07-27 RX ORDER — CEFTRIAXONE 1 G/1
1 INJECTION, POWDER, FOR SOLUTION INTRAMUSCULAR; INTRAVENOUS
Status: COMPLETED | OUTPATIENT
Start: 2018-07-27 | End: 2018-07-27

## 2018-07-27 RX ORDER — DEXAMETHASONE SODIUM PHOSPHATE 4 MG/ML
4 INJECTION, SOLUTION INTRA-ARTICULAR; INTRALESIONAL; INTRAMUSCULAR; INTRAVENOUS; SOFT TISSUE
Status: DISCONTINUED | OUTPATIENT
Start: 2018-07-27 | End: 2018-07-27

## 2018-07-27 RX ORDER — MECLIZINE HCL 12.5 MG 12.5 MG/1
25 TABLET ORAL
Status: COMPLETED | OUTPATIENT
Start: 2018-07-27 | End: 2018-07-27

## 2018-07-27 RX ORDER — PREDNISOLONE ACETATE 10 MG/ML
SUSPENSION/ DROPS OPHTHALMIC
Refills: 1 | COMMUNITY
Start: 2018-07-23 | End: 2018-09-06 | Stop reason: ALTCHOICE

## 2018-07-27 RX ORDER — AMOXICILLIN AND CLAVULANATE POTASSIUM 875; 125 MG/1; MG/1
1 TABLET, FILM COATED ORAL 2 TIMES DAILY
Qty: 14 TABLET | Refills: 0 | Status: SHIPPED | OUTPATIENT
Start: 2018-07-27 | End: 2018-07-27

## 2018-07-27 RX ORDER — MECLIZINE HYDROCHLORIDE 25 MG/1
25 TABLET ORAL 3 TIMES DAILY PRN
Qty: 20 TABLET | Refills: 0 | Status: SHIPPED | OUTPATIENT
Start: 2018-07-27 | End: 2018-08-29

## 2018-07-27 RX ORDER — CYCLOBENZAPRINE HCL 5 MG
TABLET ORAL
Refills: 0 | COMMUNITY
Start: 2018-07-19 | End: 2018-08-06

## 2018-07-27 RX ORDER — AMOXICILLIN AND CLAVULANATE POTASSIUM 875; 125 MG/1; MG/1
1 TABLET, FILM COATED ORAL 2 TIMES DAILY
Qty: 14 TABLET | Refills: 0 | Status: SHIPPED | OUTPATIENT
Start: 2018-07-27 | End: 2018-08-06

## 2018-07-27 RX ADMIN — DEXAMETHASONE SODIUM PHOSPHATE 4 MG: 4 INJECTION, SOLUTION INTRAMUSCULAR; INTRAVENOUS at 05:07

## 2018-07-27 RX ADMIN — SODIUM CHLORIDE 1000 ML: 0.9 INJECTION, SOLUTION INTRAVENOUS at 12:07

## 2018-07-27 RX ADMIN — MECLIZINE 25 MG: 12.5 TABLET ORAL at 12:07

## 2018-07-27 RX ADMIN — ONDANSETRON 4 MG: 4 TABLET, ORALLY DISINTEGRATING ORAL at 05:07

## 2018-07-27 RX ADMIN — SODIUM CHLORIDE 500 ML: 0.9 INJECTION, SOLUTION INTRAVENOUS at 03:07

## 2018-07-27 RX ADMIN — CEFTRIAXONE SODIUM 1 G: 1 INJECTION, POWDER, FOR SOLUTION INTRAMUSCULAR; INTRAVENOUS at 03:07

## 2018-07-27 RX ADMIN — ONDANSETRON 4 MG: 4 TABLET, ORALLY DISINTEGRATING ORAL at 11:07

## 2018-07-27 NOTE — ED PROVIDER NOTES
Encounter Date: 7/27/2018    SCRIBE #1 NOTE: I, Elyssa Rogers, am scribing for, and in the presence of,  Dr. Mehta. I have scribed the entire note.       History     Chief Complaint   Patient presents with    Mutiple Complaints     sick since july8, swelling and pain  to hands and feet, trouble standing     Time patient was seen by the provider: 12:09 PM      The patient is a 32 y.o. male with co-morbidities including: anemia an diverticulitis who presents to the ED with a complaint of positional dizziness with associated nausea and vomiting. Pt states that the dizziness is alleviated when he stays in one position for an extended period of time. Endorses gait difficulty, which is associated with severe dizziness, swelling in bilateral hands and feet, neck pain, and dehydration, but denies earaches, tinnitus, sore throat, or numbness to his face, arms or legs. He also reports an chronic HA that was associated with eye swelling, which has now been relieved with medication. Notes a family hx of rheumatoid arthritis.         The history is provided by the patient and medical records.     Review of patient's allergies indicates:  No Known Allergies  Past Medical History:   Diagnosis Date    Anemia     Diverticulitis     Diverticulitis     GERD (gastroesophageal reflux disease)      Past Surgical History:   Procedure Laterality Date    KNEE SURGERY      x2     Family History   Problem Relation Age of Onset    Crohn's disease Neg Hx     Ulcerative colitis Neg Hx     Celiac disease Neg Hx      Social History   Substance Use Topics    Smoking status: Never Smoker    Smokeless tobacco: Never Used    Alcohol use No     Review of Systems   Constitutional: Negative for fever.        (+) dehydration   HENT: Negative for ear pain, rhinorrhea, sinus pain, sinus pressure, sore throat and tinnitus.    Eyes: Positive for itching. Negative for photophobia and visual disturbance.   Respiratory: Negative.  Negative for wheezing.     Cardiovascular: Negative.  Negative for chest pain.   Gastrointestinal: Positive for nausea and vomiting.   Genitourinary: Negative.    Musculoskeletal: Positive for gait problem and neck pain.        (+) swelling in bilateral hands and feet   Skin: Negative.  Negative for wound.   Neurological: Positive for dizziness. Negative for numbness.   Psychiatric/Behavioral: Negative.  Negative for confusion.       Physical Exam     Initial Vitals [07/27/18 1126]   BP Pulse Resp Temp SpO2   130/74 99 18 99.4 °F (37.4 °C) 99 %      MAP       --         Physical Exam    Nursing note and vitals reviewed.  Constitutional: He appears well-developed and well-nourished. He is not diaphoretic. No distress.   HENT:   Head: Normocephalic and atraumatic.   Mouth/Throat: Oropharynx is clear and moist.   Eyes: Conjunctivae and EOM are normal. Pupils are equal, round, and reactive to light. Right eye exhibits no discharge. Left eye exhibits no discharge.   Nystagmus looking to left that is fatigable    Neck: Normal range of motion. Neck supple. No JVD present.   Cardiovascular: Normal rate, regular rhythm, normal heart sounds and intact distal pulses.   No murmur heard.  Pulmonary/Chest: Breath sounds normal. No respiratory distress. He has no wheezes. He has no rhonchi. He has no rales.   Abdominal: Soft. Bowel sounds are normal. He exhibits no distension and no mass. There is no tenderness. There is no rebound and no guarding.   Musculoskeletal: Normal range of motion. He exhibits no edema or tenderness.   Neurological: He is alert and oriented to person, place, and time. He has normal strength. No cranial nerve deficit or sensory deficit.   Skin: Skin is warm and dry. Capillary refill takes 2 to 3 seconds. No rash noted.   Psychiatric: He has a normal mood and affect.         ED Course   Procedures  Labs Reviewed   COMPREHENSIVE METABOLIC PANEL - Abnormal; Notable for the following:        Result Value    Sodium 135 (*)     Glucose  112 (*)     Albumin 2.6 (*)     Alkaline Phosphatase 150 (*)     All other components within normal limits   CBC W/ AUTO DIFFERENTIAL - Abnormal; Notable for the following:     WBC 18.90 (*)     RBC 3.81 (*)     Hemoglobin 10.6 (*)     Hematocrit 31.7 (*)     Platelets 554 (*)     Immature Granulocytes 2.8 (*)     Gran # (ANC) 13.0 (*)     Immature Grans (Abs) 0.53 (*)     Mono # 3.2 (*)     Lymph% 10.7 (*)     Mono% 17.1 (*)     Platelet Estimate Increased (*)     All other components within normal limits   SEDIMENTATION RATE - Abnormal; Notable for the following:     Sed Rate 125 (*)     All other components within normal limits   RHEUMATOID FACTOR          Imaging Results          CT Head Without Contrast (Final result)  Result time 07/27/18 13:31:42    Final result by Malick Haque MD (07/27/18 13:31:42)                 Impression:      1. No convincing acute intracranial abnormalities, noting exam is limited by patient motion.  2. Sinus disease.      Electronically signed by: Malick Haque MD  Date:    07/27/2018  Time:    13:31             Narrative:    EXAMINATION:  CT HEAD WITHOUT CONTRAST    CLINICAL HISTORY:  Dizziness;    TECHNIQUE:  Low dose axial images were obtained through the head.  Coronal and sagittal reformations were also performed. Contrast was not administered.    COMPARISON:  None.    FINDINGS:  Examination is limited secondary to patient motion.    There is no evidence of acute major vascular territory infarct, hemorrhage, or mass.  There is no hydrocephalus.  There are no abnormal extra-axial fluid collections.  There is mucous membrane thickening of the right maxillary sinus versus mucous retention cyst or polyp, otherwise the visualized paranasal sinuses and mastoid air cells are clear, and there is no evidence of calvarial fracture.  The visualized soft tissues are unremarkable.                              X-Rays:   Independently Interpreted Readings:   Head CT: Frontal sinusitis,  but brain normal.      Medical Decision Making:   History:   Old Medical Records: I decided to obtain old medical records.  Initial Assessment:   31 y/o  male with pmhx of anemia and diverticulitis who c/o positional dizziness associated with severe nausea and vomiting. His sx are worse when he stands. Feels dehydrated. I suspect vertigo. Will hydrate, get labs, and give zofran. Because he complained of a HA and neck pain, I will get imaging.   Differential Diagnosis:   PCP is working him up for Chron's Dz.  He is scheduled for out patient Colonoscopy.  He was recently treated for glaucoma with IOP of 45.  He is on treatment for the same  Independently Interpreted Test(s):   I have ordered and independently interpreted X-rays - see prior notes.  Clinical Tests:   Lab Tests: Ordered and Reviewed  ED Management:  2:37 PM  Pt has sinusitis, vertigo, and dehydration. Will d/c and treat sinus infection and vertigo.                       Clinical Impression:   The primary encounter diagnosis was Acute non-recurrent frontal sinusitis. Diagnoses of Vertigo and Dehydration were also pertinent to this visit.      Disposition:   Disposition: Discharged  Condition: Stable                        Gonzales Mehta MD  07/27/18 3600

## 2018-07-27 NOTE — ED NOTES
Pt states his hands and feet are swollen, started on 7/9, worse yesterday making walking difficult. Pt is having 7/10 pain in hands and feet. This am woke up feeling dizzy. Pt was dizzy at PCP and vomited 2x day. Pt has not been able to keep much water down. Pt was having pressure in his eyes that started Monday. Pt saw his eye doctor and was given eye drops. The eye pressure has improved.

## 2018-07-27 NOTE — ED NOTES
Pt states he wants to hold off on the nausea medicine. Pt will notify staff when he would like the nausea medicine.

## 2018-07-27 NOTE — PROGRESS NOTES
Subjective:       Patient ID: Luca Story is a 32 y.o. male who presents for Fatigue (feet and hands swelling ) and Dizziness  He is accompanied by a family member. He reports feeling too weak to walk and had family find a wheelchair for him.    Eye Pain    Both eyes are affected.This is a new problem. The current episode started today. The problem occurs constantly. The problem has been unchanged. There was no injury mechanism. The pain is moderate. Associated symptoms include blurred vision, eye redness, nausea, vomiting and weakness (generalized). Pertinent negatives include no eye discharge, fever or foreign body sensation. Associated symptoms comments: Recently started treatment for elevated pressure in eye. He has tried nothing for the symptoms.   Edema   This is a new problem. The current episode started 1 to 4 weeks ago. The problem occurs constantly. The problem has been waxing and waning. Associated symptoms include abdominal pain, arthralgias, fatigue, joint swelling (hand and ankle swelling), nausea, vomiting and weakness (generalized). Pertinent negatives include no change in bowel habit, chest pain, chills, congestion, coughing, diaphoresis, fever, headaches, myalgias, numbness, rash, sore throat or urinary symptoms. Nothing aggravates the symptoms. He has tried rest for the symptoms. The treatment provided no relief.   Pain   This is a new (under bottoms of his feet only when he stands, + ankle pains) problem. The current episode started 1 to 4 weeks ago. Associated symptoms include abdominal pain, arthralgias, fatigue, joint swelling (hand and ankle swelling), nausea, vomiting and weakness (generalized). Pertinent negatives include no change in bowel habit, chest pain, chills, congestion, coughing, diaphoresis, fever, headaches, myalgias, numbness, rash, sore throat or urinary symptoms. Nothing aggravates the symptoms. He has tried rest for the symptoms. The treatment provided no relief.    Dizziness:   Chronicity:  Recurrent  Onset:  1 to 4 weeks ago  Progression since onset:  Gradually worsening  Frequency:  Constantly  Severity:  Moderate  Duration:  Off/on all day  Dizziness characteristics:  Lightheaded/impending faint  Frequency of Spells:  Daily   Associated symptoms: nausea, vomiting, weakness (generalized), visual disturbances and light-headedness.no ear congestion, no ear pain, no fever, no headaches, no diaphoresis, no palpitations, no slurred speech, no numbness in extremities and no chest pain.  Aggravated by:  Position changes  Treatments tried:  Restno head trauma, no head trauma and no ear infections.    Patient initially presented today for bilateral foot pain and swelling but was found to have severe eye pressure. He recently went to Ophthalmologist Dr. Hayes and was found to have uveitis and very high pressure in the eye. He has not contacted the Ophthalmologist since then. Does report nausea and vomiting today. Reports vision is ok now but was impaired earlier today. Has not been working for the last week.      Review of Systems   Constitutional: Positive for fatigue. Negative for chills, diaphoresis and fever.   HENT: Negative for congestion, ear pain, postnasal drip and sore throat.    Eyes: Positive for blurred vision, pain, redness and visual disturbance. Negative for discharge.   Respiratory: Negative for cough and shortness of breath.    Cardiovascular: Positive for leg swelling. Negative for chest pain and palpitations.   Gastrointestinal: Positive for abdominal pain, nausea and vomiting. Negative for change in bowel habit, constipation and diarrhea.   Endocrine: Positive for polydipsia. Negative for polyuria.   Musculoskeletal: Positive for arthralgias and joint swelling (hand and ankle swelling). Negative for myalgias.   Skin: Negative for rash.   Neurological: Positive for dizziness, weakness (generalized) and light-headedness. Negative for numbness and headaches.          Glucose 122, O2 99%      Objective:      Physical Exam   Constitutional: He is oriented to person, place, and time. He appears well-developed and well-nourished. He appears distressed.   Eyes remain closed throughout exam   HENT:   Head: Normocephalic and atraumatic.   Right Ear: Hearing and external ear normal.   Left Ear: Hearing and external ear normal.   Nose: Nose normal.   Mouth/Throat: Uvula is midline and oropharynx is clear and moist.   Eyes: Lids are normal. Right pupil is not reactive. Left pupil is not reactive.   Dilated pupils   Neck: Full passive range of motion without pain.   Cardiovascular: Normal rate and regular rhythm.    No murmur heard.  Pulmonary/Chest: Effort normal and breath sounds normal. He has no wheezes.   Abdominal: Soft. Bowel sounds are normal. He exhibits no distension. There is no tenderness.   Musculoskeletal: Normal range of motion. He exhibits edema (1+ ble edema, trace to 1+ hand edema).        Right ankle: He exhibits swelling. He exhibits normal range of motion and normal pulse. Achilles tendon normal.        Left ankle: He exhibits swelling. He exhibits normal range of motion and normal pulse.        Right foot: There is tenderness and swelling.        Left foot: There is tenderness and swelling.   Feet:   Right Foot:   Skin Integrity: Negative for skin breakdown, erythema or warmth.   Left Foot:   Skin Integrity: Negative for skin breakdown, erythema or warmth.   Neurological: He is alert and oriented to person, place, and time. No sensory deficit. Coordination normal.   Skin: Skin is warm and dry.       Assessment/Plan:       1. Dizziness  - POCT Glucose normal    2. Generalized weakness  - POCT Glucose    3. Swelling of extremity    4. Nausea and vomiting, intractability of vomiting not specified, unspecified vomiting type  - gave a dose of Zofran 4mg SL    6. Foot pain, bilateral    Discussed with patient and family member that patient would need additional workup  and in order to evaluate nausea, weakness, foot pain and swelling, would be best to go to the ER. Patient agrees to go to ER    Shaina Walters MD

## 2018-07-30 ENCOUNTER — PATIENT MESSAGE (OUTPATIENT)
Dept: INTERNAL MEDICINE | Facility: CLINIC | Age: 33
End: 2018-07-30

## 2018-07-30 RX ORDER — METHYLPREDNISOLONE 4 MG/1
TABLET ORAL
Qty: 1 PACKAGE | Refills: 0 | Status: SHIPPED | OUTPATIENT
Start: 2018-07-30 | End: 2018-08-06

## 2018-07-30 NOTE — TELEPHONE ENCOUNTER
Voltaren would be an option for the foot pains but it can irritate the stomach and can make the reflux worse. Would not be best for him.    If the steroid for the sinusitis helped, then could add a Medrol dosepack. Call us if no improvement.     Please let him know that this is not specifically for his foot pain but can be used for the sinusitis. Would need to use tylenol for the foot pain and elevate legs.

## 2018-07-31 ENCOUNTER — ANESTHESIA EVENT (OUTPATIENT)
Dept: ENDOSCOPY | Facility: HOSPITAL | Age: 33
End: 2018-07-31
Payer: COMMERCIAL

## 2018-08-01 ENCOUNTER — ANESTHESIA (OUTPATIENT)
Dept: ENDOSCOPY | Facility: HOSPITAL | Age: 33
End: 2018-08-01
Payer: COMMERCIAL

## 2018-08-01 ENCOUNTER — SURGERY (OUTPATIENT)
Age: 33
End: 2018-08-01

## 2018-08-01 ENCOUNTER — HOSPITAL ENCOUNTER (OUTPATIENT)
Facility: HOSPITAL | Age: 33
Discharge: HOME | End: 2018-08-01
Attending: INTERNAL MEDICINE | Admitting: INTERNAL MEDICINE
Payer: COMMERCIAL

## 2018-08-01 VITALS
OXYGEN SATURATION: 98 % | WEIGHT: 213.88 LBS | DIASTOLIC BLOOD PRESSURE: 79 MMHG | TEMPERATURE: 98 F | RESPIRATION RATE: 20 BRPM | HEART RATE: 82 BPM | SYSTOLIC BLOOD PRESSURE: 125 MMHG | BODY MASS INDEX: 32.41 KG/M2 | HEIGHT: 68 IN

## 2018-08-01 DIAGNOSIS — K21.9 GERD (GASTROESOPHAGEAL REFLUX DISEASE): ICD-10-CM

## 2018-08-01 DIAGNOSIS — K21.9 GASTROESOPHAGEAL REFLUX DISEASE, ESOPHAGITIS PRESENCE NOT SPECIFIED: Primary | ICD-10-CM

## 2018-08-01 PROCEDURE — 88312 SPECIAL STAINS GROUP 1: CPT | Performed by: PATHOLOGY

## 2018-08-01 PROCEDURE — 63600175 PHARM REV CODE 636 W HCPCS: Performed by: NURSE ANESTHETIST, CERTIFIED REGISTERED

## 2018-08-01 PROCEDURE — 43239 EGD BIOPSY SINGLE/MULTIPLE: CPT | Performed by: INTERNAL MEDICINE

## 2018-08-01 PROCEDURE — 27201089 HC SNARE, DISP (ANY): Performed by: INTERNAL MEDICINE

## 2018-08-01 PROCEDURE — 88312 SPECIAL STAINS GROUP 1: CPT | Mod: 26,,, | Performed by: PATHOLOGY

## 2018-08-01 PROCEDURE — 25000003 PHARM REV CODE 250: Performed by: INTERNAL MEDICINE

## 2018-08-01 PROCEDURE — 43239 EGD BIOPSY SINGLE/MULTIPLE: CPT | Mod: 51,,, | Performed by: INTERNAL MEDICINE

## 2018-08-01 PROCEDURE — 45385 COLONOSCOPY W/LESION REMOVAL: CPT | Performed by: INTERNAL MEDICINE

## 2018-08-01 PROCEDURE — 45385 COLONOSCOPY W/LESION REMOVAL: CPT | Mod: ,,, | Performed by: INTERNAL MEDICINE

## 2018-08-01 PROCEDURE — 37000008 HC ANESTHESIA 1ST 15 MINUTES: Performed by: INTERNAL MEDICINE

## 2018-08-01 PROCEDURE — 27201012 HC FORCEPS, HOT/COLD, DISP: Performed by: INTERNAL MEDICINE

## 2018-08-01 PROCEDURE — 37000009 HC ANESTHESIA EA ADD 15 MINS: Performed by: INTERNAL MEDICINE

## 2018-08-01 PROCEDURE — 88305 TISSUE EXAM BY PATHOLOGIST: CPT | Performed by: PATHOLOGY

## 2018-08-01 PROCEDURE — E9220 PRA ENDO ANESTHESIA: HCPCS | Mod: ,,, | Performed by: NURSE ANESTHETIST, CERTIFIED REGISTERED

## 2018-08-01 PROCEDURE — 88305 TISSUE EXAM BY PATHOLOGIST: CPT | Mod: 26,,, | Performed by: PATHOLOGY

## 2018-08-01 PROCEDURE — 25000003 PHARM REV CODE 250: Performed by: NURSE ANESTHETIST, CERTIFIED REGISTERED

## 2018-08-01 RX ORDER — PROPOFOL 10 MG/ML
VIAL (ML) INTRAVENOUS
Status: DISCONTINUED | OUTPATIENT
Start: 2018-08-01 | End: 2018-08-01

## 2018-08-01 RX ORDER — LIDOCAINE HCL/PF 100 MG/5ML
SYRINGE (ML) INTRAVENOUS
Status: DISCONTINUED | OUTPATIENT
Start: 2018-08-01 | End: 2018-08-01

## 2018-08-01 RX ORDER — SODIUM CHLORIDE 0.9 % (FLUSH) 0.9 %
3 SYRINGE (ML) INJECTION
Status: DISCONTINUED | OUTPATIENT
Start: 2018-08-01 | End: 2018-08-01 | Stop reason: HOSPADM

## 2018-08-01 RX ORDER — SODIUM CHLORIDE 9 MG/ML
INJECTION, SOLUTION INTRAVENOUS CONTINUOUS
Status: DISCONTINUED | OUTPATIENT
Start: 2018-08-01 | End: 2018-08-01 | Stop reason: HOSPADM

## 2018-08-01 RX ORDER — GLYCOPYRROLATE 0.2 MG/ML
INJECTION INTRAMUSCULAR; INTRAVENOUS
Status: DISCONTINUED | OUTPATIENT
Start: 2018-08-01 | End: 2018-08-01

## 2018-08-01 RX ADMIN — PROPOFOL 40 MG: 10 INJECTION, EMULSION INTRAVENOUS at 12:08

## 2018-08-01 RX ADMIN — PROPOFOL 40 MG: 10 INJECTION, EMULSION INTRAVENOUS at 11:08

## 2018-08-01 RX ADMIN — LIDOCAINE HYDROCHLORIDE 100 MG: 20 INJECTION, SOLUTION INTRAVENOUS at 11:08

## 2018-08-01 RX ADMIN — SODIUM CHLORIDE: 0.9 INJECTION, SOLUTION INTRAVENOUS at 11:08

## 2018-08-01 RX ADMIN — GLYCOPYRROLATE 0.1 MG: 0.2 INJECTION, SOLUTION INTRAMUSCULAR; INTRAVENOUS at 11:08

## 2018-08-01 RX ADMIN — PROPOFOL 100 MG: 10 INJECTION, EMULSION INTRAVENOUS at 11:08

## 2018-08-01 NOTE — ANESTHESIA PREPROCEDURE EVALUATION
08/01/2018  Luca Story is a 32 y.o., male.    Anesthesia Evaluation    I have reviewed the Patient Summary Reports.     I have reviewed the Medications.     Review of Systems  Anesthesia Hx:  Denies Family Hx of Anesthesia complications.   Denies Personal Hx of Anesthesia complications.   Hematology/Oncology:  Hematology Normal   Oncology Normal     EENT/Dental:EENT/Dental Normal   Cardiovascular:   Exercise tolerance: good    Pulmonary:  Pulmonary Normal    Renal/:  Renal/ Normal     Hepatic/GI:   GERD    Musculoskeletal:  Musculoskeletal Normal    Neurological:  Neurology Normal    Endocrine:  Endocrine Normal    Dermatological:  Skin Normal        Physical Exam  General:  Well nourished    Airway/Jaw/Neck:  Airway Findings: Mouth Opening: Normal Tongue: Normal  General Airway Assessment: Adult  Mallampati: II  TM Distance: Normal, at least 6 cm        Eyes/Ears/Nose:  EYES/EARS/NOSE FINDINGS: Normal    Chest/Lungs:  Chest/Lungs Clear    Heart/Vascular:  Heart Findings: Normal Heart murmur: negative Vascular Findings: Normal    Abdomen:  Abdomen Findings: Normal    Musculoskeletal:  Musculoskeletal Findings: Normal   Skin:  Skin Findings: Normal    Mental Status:  Mental Status Findings:  Cooperative, Alert and Oriented         Anesthesia Plan  Type of Anesthesia, risks & benefits discussed:  Anesthesia Type:  general  Patient's Preference: General  Intra-op Monitoring Plan: standard ASA monitors  Intra-op Monitoring Plan Comments:   Post Op Pain Control Plan:   Post Op Pain Control Plan Comments:   Induction:   IV  Beta Blocker:  Patient is not currently on a Beta-Blocker (No further documentation required).       Informed Consent: Patient understands risks and agrees with Anesthesia plan.  Questions answered. Anesthesia consent signed with patient.  ASA Score: 2     Day of Surgery Review of  History & Physical:    H&P update referred to the surgeon.         Ready For Surgery From Anesthesia Perspective.

## 2018-08-01 NOTE — ANESTHESIA POSTPROCEDURE EVALUATION
"Anesthesia Post Evaluation    Patient: Luca Story    Procedure(s) Performed: Procedure(s) (LRB):  EGD (ESOPHAGOGASTRODUODENOSCOPY) (N/A)  COLONOSCOPY (N/A)    Final Anesthesia Type: general  Patient location during evaluation: PACU  Patient participation: Yes- Able to Participate  Level of consciousness: awake and alert  Post-procedure vital signs: reviewed and stable  Pain management: adequate  Airway patency: patent  PONV status at discharge: No PONV  Anesthetic complications: no      Cardiovascular status: blood pressure returned to baseline  Respiratory status: spontaneous ventilation and room air  Hydration status: euvolemic  Follow-up not needed.        Visit Vitals  /79   Pulse 82   Temp 36.5 °C (97.7 °F)   Resp 20   Ht 5' 8" (1.727 m)   Wt 97 kg (213 lb 13.5 oz)   SpO2 98%   BMI 32.52 kg/m²       Pain/Archie Score: Pain Assessment Performed: Yes (8/1/2018 10:38 AM)  Presence of Pain: denies (8/1/2018 12:24 PM)      "

## 2018-08-01 NOTE — [employer] PATIENT INSTRUCTIONS
Discharge Summary/Instructions after an Endoscopic Procedure  Patient Name: Luca Story  Patient MRN: [id number]  Patient YOB: 1985 Wednesday, August 01, 2018  Alex Jackson MD  RESTRICTIONS:  During your procedure today, you received medications for sedation.  These   medications may affect your judgment, balance and coordination.  Therefore,   for 24 hours, you have the following restrictions:   - DO NOT drive a car, operate machinery, make legal/financial decisions,   sign important papers or drink alcohol.    ACTIVITY:  Today: no heavy lifting, straining or running due to procedural   sedation/anesthesia.  The following day: return to full activity including work.  DIET:  Eat and drink normally unless instructed otherwise.     TREATMENT FOR COMMON SIDE EFFECTS:  - Mild abdominal pain, nausea, belching, bloating or excessive gas:  rest,   eat lightly and use a heating pad.  - Sore Throat: treat with throat lozenges and/or gargle with warm salt   water.  - Because air was used during the procedure, expelling large amounts of air   from your rectum or belching is normal.  - If a bowel prep was taken, you may not have a bowel movement for 1-3 days.    This is normal.  SYMPTOMS TO WATCH FOR AND REPORT TO YOUR PHYSICIAN:  1. Abdominal pain or bloating, other than gas cramps.  2. Chest pain.  3. Back pain.  4. Signs of infection such as: chills or fever occurring within 24 hours   after the procedure.  5. Rectal bleeding, which would show as bright red, maroon, or black stools.   (A tablespoon of blood from the rectum is not serious, especially if   hemorrhoids are present.)  6. Vomiting.  7. Weakness or dizziness.  GO DIRECTLY TO THE NEAREST EMERGENCY ROOM IF YOU HAVE ANY OF THE FOLLOWING:      Difficulty breathing              Chills and/or fever over 101 F   Persistent vomiting and/or vomiting blood   Severe abdominal pain   Severe chest pain   Black, tarry stools   Bleeding- more than one  tablespoon   Any other symptom or condition that you feel may need urgent attention  Your doctor recommends these additional instructions:  If any biopsies were taken, your doctors clinic will contact you in 1 to 2   weeks with any results.  - Patient has a contact number available for emergencies.  The signs and   symptoms of potential delayed complications were discussed with the   patient.  Return to normal activities tomorrow.  Written discharge   instructions were provided to the patient.   - Discharge patient to home.   - Resume previous diet.   - Continue present medications.   - Await pathology results.   For questions, problems or results please call your physician - Alex Jackson MD at Work:  [phone].  [facility], EMERGENCY ROOM PHONE NUMBER: [phone]  IF A COMPLICATION OR EMERGENCY SITUATION ARISES AND YOU ARE UNABLE TO REACH   YOUR PHYSICIAN - GO DIRECTLY TO THE EMERGENCY ROOM.  Alex Jackson MD  8/1/2018 11:36:53 AM  This report has been verified and signed electronically.  [employer]

## 2018-08-01 NOTE — TRANSFER OF CARE
"Anesthesia Transfer of Care Note    Patient: Luca Story    Procedure(s) Performed: Procedure(s) (LRB):  EGD (ESOPHAGOGASTRODUODENOSCOPY) (N/A)  COLONOSCOPY (N/A)    Patient location: PACU    Anesthesia Type: general    Transport from OR: Transported from OR on room air with adequate spontaneous ventilation    Post pain: adequate analgesia    Post assessment: no apparent anesthetic complications and tolerated procedure well    Post vital signs: stable    Level of consciousness: awake, alert and oriented    Nausea/Vomiting: no nausea/vomiting    Complications: none    Transfer of care protocol was followed      Last vitals:   Visit Vitals  /73 (BP Location: Left arm, Patient Position: Lying)   Pulse 96   Temp 36.7 °C (98.1 °F) (Temporal)   Resp 18   Ht 5' 8" (1.727 m)   Wt 97 kg (213 lb 13.5 oz)   SpO2 98%   BMI 32.52 kg/m²     "

## 2018-08-01 NOTE — INTERVAL H&P NOTE
The patient has been examined and the H&P has been reviewed:    There is no interval changes since last encounter.    EGD/Colonoscopy: GERD and Recurrent diverticulitis  Sedation: GA  ASA: Per anesthesia  Mallampati: Per anesthesia    Endoscopy risks, benefits and alternative options discussed and understood by patient/family.          Active Hospital Problems    Diagnosis  POA    GERD (gastroesophageal reflux disease) [K21.9]  Yes      Resolved Hospital Problems    Diagnosis Date Resolved POA   No resolved problems to display.

## 2018-08-01 NOTE — DISCHARGE INSTRUCTIONS

## 2018-08-01 NOTE — H&P (VIEW-ONLY)
Subjective:       Patient ID: Luca Story is a 32 y.o. male.    Chief Complaint: GI Problem    HPI  Review of Systems   Constitutional: Negative for activity change, appetite change, chills, diaphoresis, fatigue, fever and unexpected weight change.   HENT: Positive for voice change. Negative for congestion, ear pain, mouth sores, rhinorrhea, sinus pressure, sneezing, sore throat and trouble swallowing.    Eyes: Negative for pain.   Respiratory: Negative for cough and shortness of breath.    Cardiovascular: Negative for chest pain, palpitations and leg swelling.   Genitourinary: Negative for difficulty urinating and flank pain.   Musculoskeletal: Positive for myalgias. Negative for arthralgias, back pain, gait problem, joint swelling and neck pain.   Skin: Negative for rash.   Neurological: Negative for dizziness, tremors, syncope, numbness and headaches.   Hematological: Negative for adenopathy. Does not bruise/bleed easily.   Psychiatric/Behavioral: Negative for agitation, behavioral problems, confusion, decreased concentration and dysphoric mood.       Objective:      Physical Exam   Constitutional: He is oriented to person, place, and time. He appears well-developed and well-nourished. No distress.   HENT:   Right Ear: External ear normal.   Left Ear: External ear normal.   Nose: Nose normal.   Mouth/Throat: Oropharynx is clear and moist. No oropharyngeal exudate.   Eyes: Conjunctivae are normal. Pupils are equal, round, and reactive to light. No scleral icterus.   Neck: Normal range of motion. Neck supple. No thyromegaly present.   Cardiovascular: Normal rate, normal heart sounds and intact distal pulses.  Exam reveals no gallop.    No murmur heard.  Pulmonary/Chest: Effort normal and breath sounds normal. He has no wheezes.   Abdominal: Soft. Normal appearance and bowel sounds are normal. He exhibits no distension, no fluid wave, no ascites and no mass. There is no hepatosplenomegaly. There is no  "tenderness. There is no rigidity, no rebound, no guarding and no CVA tenderness.   Genitourinary:   Genitourinary Comments: recent   Musculoskeletal: Normal range of motion. He exhibits no edema or tenderness.   Lymphadenopathy:     He has no cervical adenopathy.   Neurological: He is alert and oriented to person, place, and time. He has normal reflexes. No cranial nerve deficit.   Skin: Skin is warm. No rash noted. He is not diaphoretic.   Psychiatric: He has a normal mood and affect. His behavior is normal. Thought content normal.       Assessment:       1. Gastroesophageal reflux disease, esophagitis presence not specified    2. Diverticulitis    3. Anemia, chronic disease        Plan:         HISTORY OF PRESENT ILLNESS:  This is a new patient visit for Hector.  He is here   with his girlfriend.  He has a complicated history.  He had an initial   hospitalization for diverticulitis in 2012.  He would have been about 25 or 26   years of age at that time.  He did have a microperforation.  He required 4 days   in the hospital.  He was treated with antibiotics.  He had another recurrence   later on that year, was treated as an outpatient with antibiotics.  Since that   time, he has had occasional "flare-ups."  This might happen 1 to 2 times a year.    He will experience low mid abdominal pain reminiscent of the diverticulitis.    He will take 2 days of the Cipro and Flagyl that are left over and then the   symptoms will resolve.  His bowel movements are usually normal.  Normal for Hector   is 2 to 3 soft formed stools per day.  He denies any melena or hematochezia.    He also reports "terrible" acid reflux.  This has been present since childhood.    He describes both pyrosis and regurgitation.  He particularly has problems with   nocturnal regurgitation.  He denies any dysphagia.  Prilosec helps when he   takes it and Tums gives him partial and temporary relief as well.    Recently, he had some lab work that showed a " leukocytosis with a WBC of 18 and   an anemia with normocytic hemoglobin of 11.9.    ASSESSMENT AND DECISION MAKIN.  Gastroesophageal reflux.  He has chronic longstanding symptoms.  I   emphasized the importance of compliance with the Prilosec.  I have recommended   we do an EGD to assess for any sign of esophageal damage.  2.  History of diverticulitis.  He has had 1 documented episode of   diverticulitis, seems like he has other flares of disease.  With his anemia, I   think it is very important for us to do a colonoscopy now to make sure there is   no IBD.  3.  Anemia, etiology of this is unclear.  I recommended some additional work to   include CRP, ferritin, ____, TIBC and TTG.    RECOMMENDATIONS:  1.  Labs.  2.  Set up EGD and colon.  3.  Take Prilosec everyday, take from there.      CONNOR/IN  dd: 2018 09:03:10 (CDT)  td: 2018 20:23:11 (CDT)  Doc ID   #[id number]  Job ID #[id number]    CC:

## 2018-08-01 NOTE — [employer] PATIENT INSTRUCTIONS
Discharge Summary/Instructions after an Endoscopic Procedure  Patient Name: Luca Story  Patient MRN: [id number]  Patient YOB: 1985 Wednesday, August 01, 2018  Alex Jackson MD  RESTRICTIONS:  During your procedure today, you received medications for sedation.  These   medications may affect your judgment, balance and coordination.  Therefore,   for 24 hours, you have the following restrictions:   - DO NOT drive a car, operate machinery, make legal/financial decisions,   sign important papers or drink alcohol.    ACTIVITY:  Today: no heavy lifting, straining or running due to procedural   sedation/anesthesia.  The following day: return to full activity including work.  DIET:  Eat and drink normally unless instructed otherwise.     TREATMENT FOR COMMON SIDE EFFECTS:  - Mild abdominal pain, nausea, belching, bloating or excessive gas:  rest,   eat lightly and use a heating pad.  - Sore Throat: treat with throat lozenges and/or gargle with warm salt   water.  - Because air was used during the procedure, expelling large amounts of air   from your rectum or belching is normal.  - If a bowel prep was taken, you may not have a bowel movement for 1-3 days.    This is normal.  SYMPTOMS TO WATCH FOR AND REPORT TO YOUR PHYSICIAN:  1. Abdominal pain or bloating, other than gas cramps.  2. Chest pain.  3. Back pain.  4. Signs of infection such as: chills or fever occurring within 24 hours   after the procedure.  5. Rectal bleeding, which would show as bright red, maroon, or black stools.   (A tablespoon of blood from the rectum is not serious, especially if   hemorrhoids are present.)  6. Vomiting.  7. Weakness or dizziness.  GO DIRECTLY TO THE NEAREST EMERGENCY ROOM IF YOU HAVE ANY OF THE FOLLOWING:      Difficulty breathing              Chills and/or fever over 101 F   Persistent vomiting and/or vomiting blood   Severe abdominal pain   Severe chest pain   Black, tarry stools   Bleeding- more than one  tablespoon   Any other symptom or condition that you feel may need urgent attention  Your doctor recommends these additional instructions:  If any biopsies were taken, your doctors clinic will contact you in 1 to 2   weeks with any results.  - Patient has a contact number available for emergencies.  The signs and   symptoms of potential delayed complications were discussed with the   patient.  Return to normal activities tomorrow.  Written discharge   instructions were provided to the patient.   - Discharge patient to home.   - Resume previous diet.   - Continue present medications.   - Await pathology results.   - Repeat colonoscopy in 5 years for surveillance.   For questions, problems or results please call your physician - Alex Jackson MD at Work:  [phone].  [facility], EMERGENCY ROOM PHONE NUMBER: [phone]  IF A COMPLICATION OR EMERGENCY SITUATION ARISES AND YOU ARE UNABLE TO REACH   YOUR PHYSICIAN - GO DIRECTLY TO THE EMERGENCY ROOM.  Alex Jackson MD  8/1/2018 12:08:13 PM  This report has been verified and signed electronically.  [employer]

## 2018-08-02 ENCOUNTER — PATIENT MESSAGE (OUTPATIENT)
Dept: INTERNAL MEDICINE | Facility: CLINIC | Age: 33
End: 2018-08-02

## 2018-08-03 ENCOUNTER — PATIENT MESSAGE (OUTPATIENT)
Dept: INTERNAL MEDICINE | Facility: CLINIC | Age: 33
End: 2018-08-03

## 2018-08-03 NOTE — TELEPHONE ENCOUNTER
Called and left message.     Cannot continue the steroids.     Recent EGD showed no ulcers but biopsies are being processed. Would avoid NSAID's.    Alternative would be topical analgesics like Voltaren gel tid. Would recommend changing the physical appt to next week and can address the pain if it returns.

## 2018-08-06 ENCOUNTER — OFFICE VISIT (OUTPATIENT)
Dept: INTERNAL MEDICINE | Facility: CLINIC | Age: 33
End: 2018-08-06
Payer: COMMERCIAL

## 2018-08-06 ENCOUNTER — HOSPITAL ENCOUNTER (OUTPATIENT)
Dept: RADIOLOGY | Facility: HOSPITAL | Age: 33
Discharge: HOME | End: 2018-08-06
Attending: INTERNAL MEDICINE
Payer: COMMERCIAL

## 2018-08-06 VITALS
WEIGHT: 215.81 LBS | HEART RATE: 92 BPM | BODY MASS INDEX: 32.71 KG/M2 | RESPIRATION RATE: 14 BRPM | DIASTOLIC BLOOD PRESSURE: 58 MMHG | HEIGHT: 68 IN | TEMPERATURE: 99 F | SYSTOLIC BLOOD PRESSURE: 96 MMHG

## 2018-08-06 DIAGNOSIS — M79.672 BILATERAL FOOT PAIN: ICD-10-CM

## 2018-08-06 DIAGNOSIS — H20.9 UVEITIS: ICD-10-CM

## 2018-08-06 DIAGNOSIS — R70.0 ESR RAISED: ICD-10-CM

## 2018-08-06 DIAGNOSIS — M79.10 MYALGIA: ICD-10-CM

## 2018-08-06 DIAGNOSIS — K21.9 GASTROESOPHAGEAL REFLUX DISEASE, ESOPHAGITIS PRESENCE NOT SPECIFIED: ICD-10-CM

## 2018-08-06 DIAGNOSIS — M25.50 ARTHRALGIA, UNSPECIFIED JOINT: ICD-10-CM

## 2018-08-06 DIAGNOSIS — D72.829 LEUKOCYTOSIS, UNSPECIFIED TYPE: ICD-10-CM

## 2018-08-06 DIAGNOSIS — M79.671 BILATERAL FOOT PAIN: ICD-10-CM

## 2018-08-06 DIAGNOSIS — M79.89 SWELLING OF BOTH HANDS: ICD-10-CM

## 2018-08-06 DIAGNOSIS — Z00.00 ANNUAL PHYSICAL EXAM: Primary | ICD-10-CM

## 2018-08-06 DIAGNOSIS — Z11.3 SCREEN FOR STD (SEXUALLY TRANSMITTED DISEASE): ICD-10-CM

## 2018-08-06 PROCEDURE — 71046 X-RAY EXAM CHEST 2 VIEWS: CPT | Mod: 26,,, | Performed by: RADIOLOGY

## 2018-08-06 PROCEDURE — 71046 X-RAY EXAM CHEST 2 VIEWS: CPT | Mod: TC,PO

## 2018-08-06 PROCEDURE — 99999 PR PBB SHADOW E&M-EST. PATIENT-LVL IV: CPT | Mod: PBBFAC,,, | Performed by: INTERNAL MEDICINE

## 2018-08-06 PROCEDURE — 99395 PREV VISIT EST AGE 18-39: CPT | Mod: S$GLB,,, | Performed by: INTERNAL MEDICINE

## 2018-08-06 NOTE — PROGRESS NOTES
Subjective:      Luca Story is a 32 y.o. male who presents for annual exam.    Family History:  family history includes Diabetes in his maternal grandmother; Rheum arthritis in his mother.    Health Maintenance:  Health Maintenance       Date Due Completion Date    Lipid Panel 1985 ---    TETANUS VACCINE 11/23/2003 ---    Influenza Vaccine 08/01/2018 ---        Eye exam: due, next week  Dental Exam: not recently    Tetanus: not utd    Body mass index is 32.82 kg/m².    Meds:   Current Outpatient Prescriptions:     homatropine (ISOPTO HOMATROPINE) 5 % ophthalmic solution, INT 1 GTT IN OU QID, Disp: , Rfl: 0    omeprazole (PRILOSEC OTC) 20 MG tablet, Take 1 tablet (20 mg total) by mouth once daily., Disp: 30 tablet, Rfl: 0    prednisoLONE acetate (PRED FORTE) 1 % DrpS, INT 1 GTT INTO OU QID, Disp: , Rfl: 1    meclizine (ANTIVERT) 25 mg tablet, Take 1 tablet (25 mg total) by mouth 3 (three) times daily as needed., Disp: 20 tablet, Rfl: 0    PMHx:   Past Medical History:   Diagnosis Date    Anemia     Diverticulitis     Diverticulitis     GERD (gastroesophageal reflux disease)        PSHx:     Past Surgical History:   Procedure Laterality Date    COLONOSCOPY N/A 8/1/2018    Procedure: COLONOSCOPY;  Surgeon: Alex Jackson MD;  Location: [address]);  Service: Endoscopy;  Laterality: N/A;    ESOPHAGOGASTRODUODENOSCOPY N/A 8/1/2018    Procedure: EGD (ESOPHAGOGASTRODUODENOSCOPY);  Surgeon: Alex Jackson MD;  Location: [address]);  Service: Endoscopy;  Laterality: N/A;  Case said first available; Pt okay with f/a; Dr. Garcia's next available is 8/25, pt wanting sooner    KNEE ARTHROSCOPY Right     KNEE SURGERY Left     ACL       SocHx:   Social History     Social History    Marital status:      Spouse name: N/A    Number of children: N/A    Years of education: N/A     Social History Main Topics    Smoking status: Never Smoker    Smokeless tobacco: Never Used     Alcohol use No    Drug use: Yes     Types: Marijuana      Comment: pain meds and Suboxone    Sexual activity: Yes     Other Topics Concern    None     Social History Narrative    None       Review of Systems   Constitutional: Positive for unexpected weight change (lost ~ 15 lbs in the last month). Negative for chills, diaphoresis, fatigue and fever.   HENT: Negative for congestion, dental problem, ear discharge, ear pain, mouth sores, postnasal drip, rhinorrhea, sinus pressure and sore throat.         Dry mouth   Eyes: Positive for visual disturbance (improving). Negative for redness.   Respiratory: Negative for cough, chest tightness, shortness of breath and wheezing.    Cardiovascular: Positive for leg swelling. Negative for chest pain and palpitations.   Gastrointestinal: Negative for abdominal pain, constipation, diarrhea, nausea and vomiting.   Endocrine: Positive for cold intolerance. Negative for heat intolerance.   Genitourinary: Negative for frequency, hematuria and urgency.   Musculoskeletal: Positive for arthralgias (s/p left knee surgery, right knee arthroplasty, recent fall on knees, intermittent shoulder pain) and neck pain. Negative for gait problem, joint swelling and myalgias.        Joint pains were relieved by steroid injection and Medrol dosepack   Skin: Negative for rash.   Neurological: Negative for dizziness, weakness, numbness and headaches.   Hematological: Negative for adenopathy.   Psychiatric/Behavioral: Negative for dysphoric mood and sleep disturbance.       Objective:      Physical Exam   Constitutional: He is oriented to person, place, and time. Vital signs are normal. He appears well-developed and well-nourished. No distress.   HENT:   Head: Normocephalic and atraumatic.   Right Ear: Hearing, tympanic membrane, external ear and ear canal normal. Tympanic membrane is not erythematous and not bulging.   Left Ear: Hearing, tympanic membrane, external ear and ear canal normal.  Tympanic membrane is not erythematous and not bulging.   Nose: Nose normal.   Mouth/Throat: Uvula is midline, oropharynx is clear and moist and mucous membranes are normal. No oropharyngeal exudate or posterior oropharyngeal erythema.   Eyes: Conjunctivae, EOM and lids are normal. Pupils are equal, round, and reactive to light. No scleral icterus.   Neck: Normal range of motion. Neck supple. No spinous process tenderness and no muscular tenderness present. No thyroid mass and no thyromegaly present.   Cardiovascular: Normal rate, regular rhythm, normal heart sounds, intact distal pulses and normal pulses.    No murmur heard.  Pulmonary/Chest: Effort normal and breath sounds normal. He has no wheezes.   Abdominal: Soft. Bowel sounds are normal. He exhibits no distension. There is no hepatosplenomegaly. There is no tenderness. There is no rigidity, no rebound and no guarding.   Musculoskeletal: Normal range of motion. He exhibits edema (trace hand edema, trace to 1+ foot edema).        Right knee: He exhibits normal range of motion, no swelling, no erythema and normal patellar mobility. Tenderness found.        Left knee: He exhibits normal range of motion, no erythema and normal patellar mobility. No tenderness found.        Cervical back: He exhibits normal range of motion, no pain and no spasm.        Thoracic back: He exhibits no bony tenderness and no pain.        Lumbar back: He exhibits no bony tenderness and no pain.   Lymphadenopathy:     He has no cervical adenopathy.        Right: No supraclavicular adenopathy present.        Left: No supraclavicular adenopathy present.   Neurological: He is alert and oriented to person, place, and time. He has normal reflexes. He displays normal reflexes. Coordination and gait normal.   Skin: Skin is warm, dry and intact. No rash noted.   Psychiatric: He has a normal mood and affect.   Vitals reviewed.      Assessment:       1. Annual physical exam    2. Bilateral foot  pain    3. Swelling of both hands    4. Arthralgia, unspecified joint    5. Gastroesophageal reflux disease, esophagitis presence not specified    6. ESR raised    7. Leukocytosis, unspecified type    8. Uveitis    9. Myalgia    10. Screen for STD (sexually transmitted disease)        Plan:       1. Annual physical exam  - Hemoglobin A1c; Future  - CBC auto differential; Future  - Comprehensive metabolic panel; Future  - Lipid panel; Future  - TSH; Future  - Urinalysis; Future    2. Bilateral foot pain  - relieved by steroids, autoimmune workup    3. Swelling of both hands  - Brain natriuretic peptide; Future    4. Arthralgia, unspecified joint  - MONI; Future  - Sedimentation rate; Future  - C-reactive protein; Future  - Rheumatoid factor; Future  - CYCLIC CITRUL PEPTIDE ANTIBODY, IGG; Future  - MONI; Future  - Sedimentation rate  - C-reactive protein  - Rheumatoid factor  - CYCLIC CITRUL PEPTIDE ANTIBODY, IGG  - MONI    5. Gastroesophageal reflux disease, esophagitis presence not specified  - continue PPI daily, stable, controlled  - EGD/colonoscopy pathology is pending    6. ESR raised  - MONI; Future  - X-Ray Chest PA And Lateral; Future  - Aldolase; Future  - Sedimentation rate; Future  - C-reactive protein; Future  - Rheumatoid factor; Future  - CYCLIC CITRUL PEPTIDE ANTIBODY, IGG; Future    7. Leukocytosis, unspecified type  - X-Ray Chest PA And Lateral; Future    8. Uveitis  - Hepatitis panel, acute; Future  - RPR; Future  - stable, has f/u appointment with Ophthalmology    9. Myalgia  - Aldolase; Future  - CK; Future    10. Screen for STD (sexually transmitted disease)  - Hepatitis panel, acute; Future  - Herpes simplex type 1&2 IgG,Herpes titer; Future  - HIV-1 and HIV-2 antibodies; Future  - RPR; Future      RTC in 1 month or sooner if needed      Shaina Walters MD

## 2018-08-07 ENCOUNTER — TELEPHONE (OUTPATIENT)
Dept: INTERNAL MEDICINE | Facility: CLINIC | Age: 33
End: 2018-08-07

## 2018-08-07 NOTE — TELEPHONE ENCOUNTER
Yesterdays Note says :   Arthralgia, unspecified joint  - GELA; Future  - Sedimentation rate; Future  - C-reactive protein; Future  - Rheumatoid factor; Future  - CYCLIC CITRUL PEPTIDE ANTIBODY, IGG; Future    I called and spoke to danny /  yogesh     Clarified what was needed and they will run.      Code [id number] for cla   and gela  Clarified.

## 2018-08-07 NOTE — TELEPHONE ENCOUNTER
----- Message from Ronaldo Fernandez sent at 8/7/2018 10:26 AM CDT -----  Contact: [facility] [place] [phone]  [facility] needing Clarification on orders MONI and CLA    Please call an advise  Thank you

## 2018-08-08 ENCOUNTER — PATIENT MESSAGE (OUTPATIENT)
Dept: INTERNAL MEDICINE | Facility: CLINIC | Age: 33
End: 2018-08-08

## 2018-08-08 ENCOUNTER — TELEPHONE (OUTPATIENT)
Dept: ENDOSCOPY | Facility: HOSPITAL | Age: 33
End: 2018-08-08

## 2018-08-08 LAB
APPEARANCE UR: CLEAR
BILIRUB UR QL STRIP: NEGATIVE
COLOR UR: YELLOW
GLUCOSE UR QL STRIP: NEGATIVE
HGB UR QL STRIP: NEGATIVE
KETONES UR QL STRIP: NEGATIVE
LEUKOCYTE ESTERASE UR QL STRIP: NEGATIVE
NITRITE UR QL STRIP: NEGATIVE
PH UR STRIP: 7 [PH] (ref 5–8)
PROT UR QL STRIP: NEGATIVE
SP GR UR STRIP: 1.02 (ref 1–1.03)

## 2018-08-09 ENCOUNTER — TELEPHONE (OUTPATIENT)
Dept: GASTROENTEROLOGY | Facility: CLINIC | Age: 33
End: 2018-08-09

## 2018-08-09 NOTE — TELEPHONE ENCOUNTER
----- Message from Alex Jackson MD sent at 8/9/2018  7:46 AM CDT -----  Please notify patient, the colon polyp was benign. The stomach and small bowel biopsies were normal.

## 2018-08-09 NOTE — PROGRESS NOTES
Please notify patient, the colon polyp was benign. The stomach and small bowel biopsies were normal.

## 2018-08-09 NOTE — TELEPHONE ENCOUNTER
Spoke with patient.     GI path results negative.    ESR/CRP remain elevated, elevated ferritin. MONI pending. Will notify of recs when MONI is resulted.

## 2018-08-10 LAB
ALBUMIN SERPL-MCNC: 2.9 G/DL (ref 3.6–5.1)
ALBUMIN/GLOB SERPL: 0.8 (CALC) (ref 1–2.5)
ALDOLASE SERPL-CCNC: 6.3 U/L
ALP SERPL-CCNC: 105 U/L (ref 40–115)
ALT SERPL-CCNC: 33 U/L (ref 9–46)
ANA SER QL IF: NEGATIVE
AST SERPL-CCNC: 12 U/L (ref 10–40)
BASOPHILS # BLD AUTO: 25 CELLS/UL (ref 0–200)
BASOPHILS NFR BLD AUTO: 0.2 %
BILIRUB SERPL-MCNC: 0.3 MG/DL (ref 0.2–1.2)
BNP SERPL-MCNC: 96 PG/ML
BUN SERPL-MCNC: 15 MG/DL (ref 7–25)
BUN/CREAT SERPL: 26 (CALC) (ref 6–22)
CALCIUM SERPL-MCNC: 8.7 MG/DL (ref 8.6–10.3)
CCP IGG SERPL-ACNC: <16 UNITS
CHLORIDE SERPL-SCNC: 100 MMOL/L (ref 98–110)
CHOLEST SERPL-MCNC: 140 MG/DL
CHOLEST/HDLC SERPL: 7 (CALC)
CK SERPL-CCNC: 17 U/L (ref 44–196)
CO2 SERPL-SCNC: 29 MMOL/L (ref 20–32)
CREAT SERPL-MCNC: 0.57 MG/DL (ref 0.6–1.35)
CRP SERPL-MCNC: 255.5 MG/L
EOSINOPHIL # BLD AUTO: 150 CELLS/UL (ref 15–500)
EOSINOPHIL NFR BLD AUTO: 1.2 %
ERYTHROCYTE [DISTWIDTH] IN BLOOD BY AUTOMATED COUNT: 13.1 % (ref 11–15)
ERYTHROCYTE [SEDIMENTATION RATE] IN BLOOD BY WESTERGREN METHOD: 124 MM/H
GFR SERPL CREATININE-BSD FRML MDRD: 136 ML/MIN/1.73M2
GLOBULIN SER CALC-MCNC: 3.8 G/DL (CALC) (ref 1.9–3.7)
GLUCOSE SERPL-MCNC: 91 MG/DL (ref 65–99)
HCT VFR BLD AUTO: 27.4 % (ref 38.5–50)
HDLC SERPL-MCNC: 20 MG/DL
HGB BLD-MCNC: 9 G/DL (ref 13.2–17.1)
HIV 1+2 AB+HIV1 P24 AG SERPL QL IA: NORMAL
LDLC SERPL CALC-MCNC: 93 MG/DL (CALC)
LYMPHOCYTES # BLD AUTO: 2050 CELLS/UL (ref 850–3900)
LYMPHOCYTES NFR BLD AUTO: 16.4 %
MCH RBC QN AUTO: 26.8 PG (ref 27–33)
MCHC RBC AUTO-ENTMCNC: 32.8 G/DL (ref 32–36)
MCV RBC AUTO: 81.5 FL (ref 80–100)
MONOCYTES # BLD AUTO: 2125 CELLS/UL (ref 200–950)
MONOCYTES NFR BLD AUTO: 17 %
NEUTROPHILS # BLD AUTO: 8150 CELLS/UL (ref 1500–7800)
NEUTROPHILS NFR BLD AUTO: 65.2 %
NONHDLC SERPL-MCNC: 120 MG/DL (CALC)
PLATELET # BLD AUTO: 385 THOUSAND/UL (ref 140–400)
PMV BLD REES-ECKER: 9.8 FL (ref 7.5–12.5)
POTASSIUM SERPL-SCNC: 4.5 MMOL/L (ref 3.5–5.3)
PROT SERPL-MCNC: 6.7 G/DL (ref 6.1–8.1)
RBC # BLD AUTO: 3.36 MILLION/UL (ref 4.2–5.8)
RHEUMATOID FACT SERPL-ACNC: <14 IU/ML
RPR SER QL: NORMAL
SODIUM SERPL-SCNC: 138 MMOL/L (ref 135–146)
TRIGL SERPL-MCNC: 167 MG/DL
TSH SERPL-ACNC: 1.05 MIU/L (ref 0.4–4.5)
WBC # BLD AUTO: 12.5 THOUSAND/UL (ref 3.8–10.8)

## 2018-08-15 ENCOUNTER — PATIENT MESSAGE (OUTPATIENT)
Dept: INTERNAL MEDICINE | Facility: CLINIC | Age: 33
End: 2018-08-15

## 2018-08-15 DIAGNOSIS — D72.829 LEUKOCYTOSIS, UNSPECIFIED TYPE: ICD-10-CM

## 2018-08-15 DIAGNOSIS — R16.2 HEPATOSPLENOMEGALY: ICD-10-CM

## 2018-08-15 DIAGNOSIS — M79.672 BILATERAL FOOT PAIN: ICD-10-CM

## 2018-08-15 DIAGNOSIS — D64.9 ANEMIA, UNSPECIFIED TYPE: Primary | ICD-10-CM

## 2018-08-15 DIAGNOSIS — M79.671 BILATERAL FOOT PAIN: ICD-10-CM

## 2018-08-15 DIAGNOSIS — R16.0 HEPATOMEGALY: ICD-10-CM

## 2018-08-15 DIAGNOSIS — R79.82 CRP ELEVATED: ICD-10-CM

## 2018-08-15 DIAGNOSIS — R70.0 ESR RAISED: ICD-10-CM

## 2018-08-16 RX ORDER — DICLOFENAC SODIUM 50 MG/1
50 TABLET, DELAYED RELEASE ORAL 2 TIMES DAILY PRN
Qty: 30 TABLET | Refills: 0 | Status: SHIPPED | OUTPATIENT
Start: 2018-08-16 | End: 2018-09-04 | Stop reason: SDUPTHER

## 2018-08-22 PROBLEM — R16.0 HEPATOMEGALY: Status: ACTIVE | Noted: 2018-08-22

## 2018-08-22 PROBLEM — R16.2 HEPATOSPLENOMEGALY: Status: ACTIVE | Noted: 2018-08-22

## 2018-08-22 NOTE — TELEPHONE ENCOUNTER
Will check procalcitonin, acute hepatitis panel, serum myoglobulin, LDH, esr, crp, ceruloplasmin, copper, pANCA, CBC, peripheral smear- will send to [facility].    Enlarged liver and spleen on last CT, will refer to Hepatology for further evaluation.    Needs follow up appt in 2-3 weeks and will review labs.

## 2018-08-23 NOTE — TELEPHONE ENCOUNTER
Patient returned call, reviewed lab results, additional labs scheduled, referral sent to  for scheduling. Patient notified he will be contacted in today or tomorrow to schedule referral. Follow up appt scheduled for 2 weeks.

## 2018-08-26 PROBLEM — D72.821 MONOCYTOSIS: Status: ACTIVE | Noted: 2018-08-26

## 2018-08-26 PROBLEM — D75.839 THROMBOCYTOSIS: Status: ACTIVE | Noted: 2018-08-26

## 2018-08-29 ENCOUNTER — TELEPHONE (OUTPATIENT)
Dept: HEPATOLOGY | Facility: CLINIC | Age: 33
End: 2018-08-29

## 2018-08-29 ENCOUNTER — PROCEDURE VISIT (OUTPATIENT)
Dept: HEPATOLOGY | Facility: CLINIC | Age: 33
End: 2018-08-29
Attending: NURSE PRACTITIONER
Payer: COMMERCIAL

## 2018-08-29 ENCOUNTER — OFFICE VISIT (OUTPATIENT)
Dept: HEPATOLOGY | Facility: CLINIC | Age: 33
End: 2018-08-29
Payer: COMMERCIAL

## 2018-08-29 VITALS
BODY MASS INDEX: 33.51 KG/M2 | OXYGEN SATURATION: 100 % | WEIGHT: 221.13 LBS | SYSTOLIC BLOOD PRESSURE: 140 MMHG | DIASTOLIC BLOOD PRESSURE: 90 MMHG | RESPIRATION RATE: 18 BRPM | HEIGHT: 68 IN | TEMPERATURE: 98 F | HEART RATE: 97 BPM

## 2018-08-29 DIAGNOSIS — R16.2 HEPATOSPLENOMEGALY: ICD-10-CM

## 2018-08-29 DIAGNOSIS — D72.821 MONOCYTOSIS: ICD-10-CM

## 2018-08-29 DIAGNOSIS — D75.839 THROMBOCYTOSIS: ICD-10-CM

## 2018-08-29 DIAGNOSIS — D64.9 ANEMIA, UNSPECIFIED TYPE: ICD-10-CM

## 2018-08-29 DIAGNOSIS — R16.2 HEPATOSPLENOMEGALY: Primary | ICD-10-CM

## 2018-08-29 DIAGNOSIS — D72.829 LEUKOCYTOSIS, UNSPECIFIED TYPE: ICD-10-CM

## 2018-08-29 LAB
BASOPHILS # BLD AUTO: 58 CELLS/UL (ref 0–200)
BASOPHILS NFR BLD AUTO: 0.5 %
CERULOPLASMIN SERPL-MCNC: 48 MG/DL (ref 18–36)
COPPER SERPL-MCNC: 225 MCG/DL (ref 70–175)
CRP SERPL-MCNC: 129.1 MG/L
EOSINOPHIL # BLD AUTO: 357 CELLS/UL (ref 15–500)
EOSINOPHIL NFR BLD AUTO: 3.1 %
ERYTHROCYTE [DISTWIDTH] IN BLOOD BY AUTOMATED COUNT: 13.5 % (ref 11–15)
ERYTHROCYTE [SEDIMENTATION RATE] IN BLOOD BY WESTERGREN METHOD: >130 MM/H
FERRITIN SERPL-MCNC: 335 NG/ML (ref 20–345)
HCT VFR BLD AUTO: 25.1 % (ref 38.5–50)
HGB BLD-MCNC: 8.3 G/DL (ref 13.2–17.1)
LDH SERPL P TO L-CCNC: 162 U/L (ref 100–220)
LYMPHOCYTES # BLD AUTO: 2990 CELLS/UL (ref 850–3900)
LYMPHOCYTES NFR BLD AUTO: 26 %
MCH RBC QN AUTO: 25.4 PG (ref 27–33)
MCHC RBC AUTO-ENTMCNC: 33.1 G/DL (ref 32–36)
MCV RBC AUTO: 76.8 FL (ref 80–100)
MONOCYTES # BLD AUTO: 1323 CELLS/UL (ref 200–950)
MONOCYTES NFR BLD AUTO: 11.5 %
MYOGLOBIN SERPL-MCNC: <25 MCG/L
NEUTROPHILS # BLD AUTO: 6774 CELLS/UL (ref 1500–7800)
NEUTROPHILS NFR BLD AUTO: 58.9 %
PATH REPORT.FINAL DX SPEC: NORMAL
PATH REV BLD -IMP: NORMAL
PATHOLOGIST NAME: NORMAL
PLATELET # BLD AUTO: 619 THOUSAND/UL (ref 140–400)
PMV BLD REES-ECKER: 9.3 FL (ref 7.5–12.5)
RBC # BLD AUTO: 3.27 MILLION/UL (ref 4.2–5.8)
SPECIMEN SOURCE: NORMAL
WBC # BLD AUTO: 11.5 THOUSAND/UL (ref 3.8–10.8)

## 2018-08-29 PROCEDURE — 91200 LIVER ELASTOGRAPHY: CPT | Mod: S$GLB,,, | Performed by: NURSE PRACTITIONER

## 2018-08-29 PROCEDURE — 99999 PR PBB SHADOW E&M-EST. PATIENT-LVL V: CPT | Mod: PBBFAC,,, | Performed by: NURSE PRACTITIONER

## 2018-08-29 PROCEDURE — 3008F BODY MASS INDEX DOCD: CPT | Mod: CPTII,S$GLB,, | Performed by: NURSE PRACTITIONER

## 2018-08-29 PROCEDURE — 99214 OFFICE O/P EST MOD 30 MIN: CPT | Mod: S$GLB,,, | Performed by: NURSE PRACTITIONER

## 2018-08-29 NOTE — PROGRESS NOTES
"[facility] VISIT NEW PT NOTE    REFERRING PROVIDER: Shaina Walters MD     CHIEF COMPLAINT: hepatosplenomegaly    HPI: This is a 32 y.o. White male with PMH as below referred for evaluation of hepatosplenomegaly. He was in his usual state of health until early July when he started feeling unwell and developed swelling in his feet, hands, back, and neck and started losing weight. He had a CT scan on 7/19/18 that showed hepatomegaly at 21.0 cm, liver homogenous, no mass. Bile ducts nl. Spleen enlarged at 15 cm. No ascites. His labs indicate normal lft's. Transaminases and alk phos were mildly elevated on 7/19 but then have normalized. He has had anemia that is worse on most recent labs. He has not had evaluation with hematology for abnormalities on CBC. He had EGD and colonoscopy w/o signs of bleeding. Interestingly, he got a tattoo 3 days prior to all this starting on 7/3/18. HIV negative 8/7/18. Hep B and C have not been tested.       He reports he rarely drinks alcohol. Has an uncle that had a liver transplant from fatty liver and alcohol.     Denies jaundice, dark urine, abdominal distention, hematemesis, melena, slowed mentation. No abnormal skin rashes. No generalized joint or muscle pain. His "swelling" has improved with Voltaren.       Review of patient's allergies indicates:  No Known Allergies    Current Outpatient Medications on File Prior to Visit   Medication Sig Dispense Refill    diclofenac (VOLTAREN) 50 MG EC tablet Take 1 tablet (50 mg total) by mouth 2 (two) times daily as needed. 30 tablet 0    omeprazole (PRILOSEC OTC) 20 MG tablet Take 1 tablet (20 mg total) by mouth once daily. 30 tablet 0    homatropine (ISOPTO HOMATROPINE) 5 % ophthalmic solution INT 1 GTT IN OU QID  0    prednisoLONE acetate (PRED FORTE) 1 % DrpS INT 1 GTT INTO OU QID  1    [DISCONTINUED] meclizine (ANTIVERT) 25 mg tablet Take 1 tablet (25 mg total) by mouth 3 (three) times daily as needed. 20 tablet 0 " "    No current facility-administered medications on file prior to visit.        PMHX:  has a past medical history of Anemia, Diverticulitis, Diverticulitis, and GERD (gastroesophageal reflux disease).    PSHX:  has a past surgical history that includes Knee surgery (Left); Knee arthroscopy (Right); EGD (ESOPHAGOGASTRODUODENOSCOPY) (N/A, 8/1/2018); and COLONOSCOPY (N/A, 8/1/2018).    FAMILY HISTORY: Negative for liver disease, reviewed in [facility]    SOCIAL HISTORY:   Social History     Tobacco Use   Smoking Status Never Smoker   Smokeless Tobacco Never Used       Social History     Substance and Sexual Activity   Alcohol Use No       Social History     Substance and Sexual Activity   Drug Use Yes    Comment: pain meds and Suboxone, stopped 2013         ROS:   GENERAL: Denies fever, chills, (+) weight loss, fatigue  HEENT: Denies headaches, dizziness, vision/hearing changes  CARDIOVASCULAR: Denies chest pain, palpitations, or edema  RESPIRATORY: Denies dyspnea, cough  GI: Denies abdominal pain, rectal bleeding, nausea, vomiting. No change in bowel pattern or color  : Denies dysuria, hematuria   SKIN: Denies rash, itching   NEURO: Denies confusion, memory loss, or mood changes  PSYCH: Denies depression or anxiety  HEME/LYMPH: Denies easy bruising or bleeding        PHYSICAL EXAM:   Friendly White male, in no acute distress; alert and oriented to person, place and time  VITALS: BP (!) 140/90 (BP Location: Left arm, Patient Position: Sitting)   Pulse 97   Temp 98.2 °F (36.8 °C) (Oral)   Resp 18   Ht 5' 8" (1.727 m)   Wt 100.3 kg (221 lb 1.9 oz)   SpO2 100%   BMI 33.62 kg/m²   HENT: Normocephalic, without obvious abnormality. Oral mucosa pink and moist. Dentition good.  EYES: Sclerae anicteric. (+) conjunctival pallor.   NECK: Supple. No masses or cervical adenopathy.  CARDIOVASCULAR: Regular rate and rhythm. No murmurs.  RESPIRATORY: Normal respiratory effort. BBS CTA. No wheezes or crackles.  GI: Soft, non-tender, " non-distended. (+) hepatosplenomegaly. No masses palpable. No ascites.  EXTREMITIES:  No clubbing, cyanosis or edema.  SKIN: Warm and dry. No jaundice. No rashes noted to exposed skin. No telangectasias noted. No palmar erythema.  NEURO:  Normal gate. No asterixis.  PSYCH:  Memory intact. Thought and speech pattern appropriate. Behavior normal. No depression or anxiety noted.      RECENT LABS:    Labs:  Lab Results   Component Value Date    WBC 11.5 (H) 08/23/2018    HGB 8.3 (L) 08/23/2018    HCT 25.1 (L) 08/23/2018     (H) 08/23/2018    CHOL 140 08/07/2018    TRIG 167 (H) 08/07/2018    HDL 20 (L) 08/07/2018     08/07/2018    K 4.5 08/07/2018    CREATININE 0.57 (L) 08/07/2018    ALT 33 08/07/2018    AST 12 08/07/2018    ALKPHOS 105 08/07/2018    BILITOT 0.3 08/07/2018    ALBUMIN 2.9 (L) 08/07/2018       DIAGNOSTIC STUDIES:  EGD- 8/1/18  Impression:           - Z-line irregular, 37 cm from the incisors,                         biopsied to evaluate for Calvillo's esophagus.                        - Small hiatal hernia. Stomach biopsied to r/o                         H.pylori.                        - Normal examined duodenum, biopsied.    COLONOSCOPY- 8/1/18  Impression:           - Diverticulosis in the sigmoid colon.                        - One sigmoid colon polyp was resected and                         retrieved.    CT SCAN- 7/19/18  FINDINGS:  ABDOMEN/LOWER THORAX:    - Visualized heart: No cardiomegaly. No significant pericardial effusion.    - Lung bases/pleura: Minimal left basilar atelectasis.  No focal consolidation, mass, or pneumothorax.  No significant pleural fluid.    - Liver: Enlarged in size measuring up to 21.0 cm, homogeneous in attenuation, and without focal lesion.    - Gallbladder: No calcified gallstones.    - Bile Ducts: No evidence of intra or extra hepatic biliary ductal dilation.    - Spleen: Enlarged measuring up to 15.0 cm.  No discrete lesion.    - Kidneys: Early bilateral  nephrograms.  Normal morphology without evidence of mass or hydronephrosis.  No nephrolithiasis.  Ureters and bladder demonstrate no significant abnormality.    - Adrenals: Within normal limits.    - Pancreas: No mass or peripancreatic fat stranding.    - Retroperitoneum:  No significant adenopathy.    - Vascular: No abdominal aortic aneurysm.  No significant atherosclerosis.    - Abdominal wall:  No significant abnormality.    PELVIS:    No pelvic mass, adenopathy, or free fluid.    BOWEL/MESENTERY/PERITONEUM:    The stomach demonstrates no significant abnormality.  Contrast is visualized in the distal small bowel and proximal large bowel.  No evidence of bowel obstruction or inflammation.  Numerous sigmoid colon diverticula without evidence of significant adjacent inflammatory change to suggest acute diverticulitis.  Appendix is unremarkable.  Note is made of multiple normal sized mesenteric lymph nodes in the right lower quadrant.  No significant mesenteric edema.  No free air or ascites.    BONES: No acute fracture or aggressive osseous lesions.      Impression       No acute abdominal or pelvic pathology, specifically noting no evidence of acute diverticulitis in this patient with sigmoid diverticulosis.    Hepatosplenomegaly.    Additional findings as above.       ASSESSMENT:  32 y.o. White male with:  1.  Hepatosplenomegaly  -- could be due to fatty liver but liver homogenous on CT  -- currently normal liver enzymes  -- plts elevated  2. Anemia, leukocytosis, thrombocytosis, monocytosis  -- referral to hematology        PLAN:  1. Labs for CBC, CMP, INR, PETH, hep B, hep C. Pt requests labs at [facility] d/t insurance. Order given  2. Fibroscan today  3. F/u pending results      Thank you for allowing me to participate in the care of KIRSTEN Mancilla

## 2018-08-29 NOTE — LETTER
August 29, 2018      Shaina Walters MD  [address]  [place] [address]           [facility]  [address]  [facility] [address]  Phone: [phone]  Fax: [phone]          Patient: Luca Story   MR Number: [id number]   YOB: 1985   Date of Visit: 8/29/2018       Dear Dr. Shaina Walters:    Thank you for referring Luca Story to me for evaluation. Attached you will find relevant portions of my assessment and plan of care.    If you have questions, please do not hesitate to call me. I look forward to following Luca Story along with you.    Sincerely,    Nohemy Moreno, DWIGHT    Enclosure  CC:  No Recipients    If you would like to receive this communication electronically, please contact [email or web address] or [phone] to request more information on [employer] access.    For providers and/or their staff who would like to refer a patient to [facility], please contact us through our one-stop-shop provider referral line, [facility], at [phone].    If you feel you have received this communication in error or would no longer like to receive these types of communications, please e-mail [email or web address]

## 2018-08-29 NOTE — TELEPHONE ENCOUNTER
Spoke w/pt to let him know lab orders were being mailed out , & also that he would be contacted with fibroscan results. Per Nohemy///////////

## 2018-08-29 NOTE — PROGRESS NOTES
I have personally performed a face to face diagnostic evaluation on this patient. I have reviewed and agree with today's findings and the care plan outlined by Nohemy Moreno NP      My findings are as follows:  Patient presents with generalized edema and high inflammatory markers    - hepatomegaly on CT/splenomegaly reported but only 15cm    - Body mass index is 33.62 kg/m².  - may have an element of NAFLD but I don't think that this was the cause of his recent symptoms which have largely recovered.    - stage liver disease and rule out viral infection since he had a tattoo a few days before onset.      he will return to Nohemy Moreno NP  for follow-up.

## 2018-08-30 ENCOUNTER — PATIENT MESSAGE (OUTPATIENT)
Dept: HEPATOLOGY | Facility: CLINIC | Age: 33
End: 2018-08-30

## 2018-08-30 NOTE — PROCEDURES
Fibroscan Procedure     Name: Luca Story  Date of Procedure : 2018   :: Nohemy Moreno NP  Diagnosis: hepatosplenomegaly    Probe: M    Fibroscan readin.6 KPa    Fibrosis:F 0-1     CAP readin dB/m    Steatosis: :S3

## 2018-09-04 ENCOUNTER — PATIENT MESSAGE (OUTPATIENT)
Dept: INTERNAL MEDICINE | Facility: CLINIC | Age: 33
End: 2018-09-04

## 2018-09-04 RX ORDER — DICLOFENAC SODIUM 75 MG/1
75 TABLET, DELAYED RELEASE ORAL 2 TIMES DAILY PRN
Qty: 40 TABLET | Refills: 0 | Status: SHIPPED | OUTPATIENT
Start: 2018-09-04 | End: 2018-10-01 | Stop reason: SDUPTHER

## 2018-09-06 ENCOUNTER — HOSPITAL ENCOUNTER (OUTPATIENT)
Dept: RADIOLOGY | Facility: HOSPITAL | Age: 33
Discharge: HOME | End: 2018-09-06
Attending: INTERNAL MEDICINE
Payer: COMMERCIAL

## 2018-09-06 ENCOUNTER — OFFICE VISIT (OUTPATIENT)
Dept: INTERNAL MEDICINE | Facility: CLINIC | Age: 33
End: 2018-09-06
Payer: COMMERCIAL

## 2018-09-06 VITALS
SYSTOLIC BLOOD PRESSURE: 98 MMHG | TEMPERATURE: 98 F | HEIGHT: 68 IN | WEIGHT: 218.06 LBS | DIASTOLIC BLOOD PRESSURE: 78 MMHG | RESPIRATION RATE: 18 BRPM | HEART RATE: 81 BPM | BODY MASS INDEX: 33.05 KG/M2 | OXYGEN SATURATION: 98 %

## 2018-09-06 DIAGNOSIS — D72.829 LEUKOCYTOSIS, UNSPECIFIED TYPE: Primary | ICD-10-CM

## 2018-09-06 DIAGNOSIS — Z77.22: ICD-10-CM

## 2018-09-06 DIAGNOSIS — M25.50 ARTHRALGIA, UNSPECIFIED JOINT: ICD-10-CM

## 2018-09-06 DIAGNOSIS — R79.82 CRP ELEVATED: ICD-10-CM

## 2018-09-06 DIAGNOSIS — R70.0 ESR RAISED: ICD-10-CM

## 2018-09-06 DIAGNOSIS — D72.829 LEUKOCYTOSIS, UNSPECIFIED TYPE: ICD-10-CM

## 2018-09-06 LAB
ALBUMIN/GLOBULIN RATIO: 1
ALBUMIN: 4.2
ALCOHOL (ETHANOL), BLOOD: ABNORMAL
ALP SERPL-CCNC: 76 U/L (ref 25–125)
ALT SERPL W P-5'-P-CCNC: 15 U/L (ref 10–40)
AST SERPL-CCNC: 10 U/L (ref 14–40)
BASOPHILS ABSOLUTE COUNT: 60 /ΜL
BASOPHILS NFR BLD: 1 % (ref 0–3)
BILIRUB SERPL-MCNC: 0.3 MG/DL (ref 0.1–1.4)
BUN SERPL-MCNC: 23 MG/DL
CALCIUM SERPL-MCNC: 9.5 MG/DL (ref 8.7–10.7)
CHLORIDE SERPL-SCNC: 100 MMOL/L (ref 99–108)
CO2 SERPL-SCNC: 28 MMOL/L (ref 13–22)
CREAT SERPL-MCNC: 0.7 MG/DL (ref 0.6–1.3)
EGFR IF AFRICAN AMERICAN: 143
EOSINOPHIL NFR BLD: 1.9 %
EOSINOPHILS ABSOLUTE COUNT: 228 /ΜL
ERYTHROCYTE [DISTWIDTH] IN BLOOD BY AUTOMATED COUNT: 14.5 %
EST. GFR  (NON AFRICAN AMERICAN): 123 MG/DL
GLOBULIN: 4.1
GLUCOSE SERPL-MCNC: 102 MG/DL
HBV SURFACE AG SERPL QL IA: NEGATIVE
HCT VFR BLD AUTO: 28 % (ref 41–53)
HCV AB SERPL QL IA: NEGATIVE
HGB BLD-MCNC: 9.3 G/DL (ref 13.5–17.5)
INR PPP: 1 (ref 2–3)
LYMPHOCYTES %: 25 % (ref 18–52)
LYMPHOCYTES ABSOLUTE COUNT: 2976 /?L
MCH RBC QN AUTO: 25.3 PG
MCHC RBC AUTO-ENTMCNC: 32.7 G/DL
MCV RBC AUTO: 77.2 FL
MONOCYTES %: 11
MONOCYTES ABSOLUTE COUNT: 1320
NEUTROPHILS ABSOLUTE COUNT: 7416 /ΜL
NEUTROPHILS NFR BLD: 61.8 %
PLATELET # BLD AUTO: 515 K/ΜL (ref 150–399)
PMV BLD AUTO: 9.6 FL (ref 7.5–11.5)
POTASSIUM SERPL-SCNC: 4.5 MMOL/L (ref 3.4–5.3)
PROTHROMBIN TIME: 10.1 S
RBC: 3.68
SODIUM BLD-SCNC: 137 MMOL/L (ref 137–147)
TOTAL PROTEIN: 8.3 G/DL (ref 6.4–8.2)
WBC # BLD: 12 10*3/UL

## 2018-09-06 PROCEDURE — 99214 OFFICE O/P EST MOD 30 MIN: CPT | Mod: S$GLB,,, | Performed by: INTERNAL MEDICINE

## 2018-09-06 PROCEDURE — 71270 CT THORAX DX C-/C+: CPT | Mod: 26,,, | Performed by: RADIOLOGY

## 2018-09-06 PROCEDURE — 25500020 PHARM REV CODE 255: Performed by: INTERNAL MEDICINE

## 2018-09-06 PROCEDURE — 3008F BODY MASS INDEX DOCD: CPT | Mod: CPTII,S$GLB,, | Performed by: INTERNAL MEDICINE

## 2018-09-06 PROCEDURE — 99999 PR PBB SHADOW E&M-EST. PATIENT-LVL III: CPT | Mod: PBBFAC,,, | Performed by: INTERNAL MEDICINE

## 2018-09-06 PROCEDURE — 71270 CT THORAX DX C-/C+: CPT | Mod: TC

## 2018-09-06 RX ADMIN — IOHEXOL 75 ML: 350 INJECTION, SOLUTION INTRAVENOUS at 06:09
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
## 2018-09-10 ENCOUNTER — INITIAL CONSULT (OUTPATIENT)
Dept: HEMATOLOGY/ONCOLOGY | Facility: CLINIC | Age: 33
End: 2018-09-10
Payer: COMMERCIAL

## 2018-09-10 VITALS
HEART RATE: 84 BPM | DIASTOLIC BLOOD PRESSURE: 71 MMHG | HEIGHT: 68 IN | BODY MASS INDEX: 33.41 KG/M2 | TEMPERATURE: 99 F | OXYGEN SATURATION: 96 % | WEIGHT: 220.44 LBS | SYSTOLIC BLOOD PRESSURE: 125 MMHG

## 2018-09-10 DIAGNOSIS — D72.821 MONOCYTOSIS: Primary | ICD-10-CM

## 2018-09-10 DIAGNOSIS — D64.9 ANEMIA, UNSPECIFIED TYPE: ICD-10-CM

## 2018-09-10 DIAGNOSIS — D72.829 LEUKOCYTOSIS, UNSPECIFIED TYPE: ICD-10-CM

## 2018-09-10 DIAGNOSIS — R16.0 HEPATOMEGALY: ICD-10-CM

## 2018-09-10 PROCEDURE — 99999 PR PBB SHADOW E&M-EST. PATIENT-LVL III: CPT | Mod: PBBFAC,,, | Performed by: INTERNAL MEDICINE

## 2018-09-10 PROCEDURE — 3008F BODY MASS INDEX DOCD: CPT | Mod: CPTII,S$GLB,, | Performed by: INTERNAL MEDICINE

## 2018-09-10 PROCEDURE — 99205 OFFICE O/P NEW HI 60 MIN: CPT | Mod: S$GLB,,, | Performed by: INTERNAL MEDICINE

## 2018-09-10 NOTE — LETTER
September 11, 2018      Nohemy Moreno, DWIGHT  [address]  [facility] [address]           [facility]  [address]  [facility] [address]  Phone: [phone]          Patient: Luca Story   MR Number: [id number]   YOB: 1985   Date of Visit: 9/10/2018       Dear Nohemy Moreno:    Thank you for referring Luca Story to me for evaluation. Attached you will find relevant portions of my assessment and plan of care.    If you have questions, please do not hesitate to call me. I look forward to following Luca Story along with you.    Sincerely,    Verenice Goodrich MD    Enclosure  CC:  No Recipients    If you would like to receive this communication electronically, please contact [email or web address] or [phone] to request more information on [employer] Link access.    For providers and/or their staff who would like to refer a patient to [facility], please contact us through our one-stop-shop provider referral line, [facility], at [phone].    If you feel you have received this communication in error or would no longer like to receive these types of communications, please e-mail [email or web address]

## 2018-09-11 ENCOUNTER — TELEPHONE (OUTPATIENT)
Dept: HEPATOLOGY | Facility: CLINIC | Age: 33
End: 2018-09-11

## 2018-09-11 NOTE — PROGRESS NOTES
Subjective:       Patient ID: Luca Story is a 32 y.o. male.    Chief Complaint: Abnormal Lab    Hector presents with a complex medical history starting 7/8/18 after starting a large tattoo on the left leg. 3 days after first tatttoo developed what sounds to be an acute viral illness that had him bed bound for 3 days. He did seek medical help and received an unknown medication. At recovery he developed severe edema of the hands and feet. 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 He has developed hepatomegaly and splenomegaly. He has developed a leukocytosis with granulocytosis and monocytosis. He has a new anemia that was microcytic with elevated iron levels and is now normocytic with normal iron levels. He also has a thrombocytosis. Current medications are Volteren and Diclofenac.    Colonoscopy and Upper GI in August were essentially normal. Biopsies for celiac and H pylori were negative. Colon polyp biopsy was benign. CT imaging of of the abdomen does not have lymphadenopathy associated with the hepatomegaly or splenomegaly. CT chest does not have any lymphadenopathy. Pathology review of the peripheral smear is benign.    The patient has an Uncle with a history of fatty liver disease who has received a liver transplant.   His grandmother takes hydroxyurea for essential thrombocytosis.    HPI  Review of Systems   Constitutional: Negative.    HENT: Negative.    Eyes: Negative.    Respiratory: Negative.    Cardiovascular: Positive for leg swelling.   Gastrointestinal: Negative.    Endocrine: Negative.    Genitourinary: Negative.    Skin: Negative.    Allergic/Immunologic: Negative for environmental allergies, food allergies and immunocompromised state.   Neurological: Negative.    Hematological: Negative for adenopathy. Does not bruise/bleed easily.   Psychiatric/Behavioral: Negative.        Objective:      Physical Exam   Constitutional: He is oriented to person, place, and time. He appears  well-developed and well-nourished.   HENT:   Head: Normocephalic and atraumatic.   Eyes: Conjunctivae are normal. No scleral icterus.   Neck: Normal range of motion. Neck supple.   Cardiovascular: Normal rate and intact distal pulses.   Pulmonary/Chest: Effort normal. No respiratory distress.   Abdominal: Soft. He exhibits no distension. There is no tenderness.   Musculoskeletal: Normal range of motion. He exhibits no edema.   Neurological: He is alert and oriented to person, place, and time. No cranial nerve deficit.   Skin: Skin is warm and dry.   Psychiatric: He has a normal mood and affect. His behavior is normal.   Nursing note and vitals reviewed.      Assessment:       1. Monocytosis    2. Hepatomegaly    3. Anemia, unspecified type    4. Leukocytosis, unspecified type        Plan:         CBC changes. Following orders written for [facility] lab collection.    1. Viral testing: acute hepatitis panel, CMV and EBV PCR requested    2. Update CBC    3. BCRABL and Jak2 mutation     Visit 45 minutes with 50% counseling

## 2018-09-11 NOTE — TELEPHONE ENCOUNTER
----- Message from Ginette Sanchez MD sent at 9/10/2018  6:52 PM CDT -----  Please inform patient results are OK.

## 2018-09-19 ENCOUNTER — PATIENT MESSAGE (OUTPATIENT)
Dept: HEMATOLOGY/ONCOLOGY | Facility: CLINIC | Age: 33
End: 2018-09-19

## 2018-09-22 ENCOUNTER — PATIENT MESSAGE (OUTPATIENT)
Dept: INTERNAL MEDICINE | Facility: CLINIC | Age: 33
End: 2018-09-22

## 2018-10-01 ENCOUNTER — PATIENT MESSAGE (OUTPATIENT)
Dept: HEMATOLOGY/ONCOLOGY | Facility: CLINIC | Age: 33
End: 2018-10-01

## 2018-10-01 ENCOUNTER — PATIENT MESSAGE (OUTPATIENT)
Dept: INTERNAL MEDICINE | Facility: CLINIC | Age: 33
End: 2018-10-01

## 2018-10-01 RX ORDER — DICLOFENAC SODIUM 75 MG/1
75 TABLET, DELAYED RELEASE ORAL 2 TIMES DAILY PRN
Qty: 40 TABLET | Refills: 0 | Status: SHIPPED | OUTPATIENT
Start: 2018-10-01

## 2018-10-01 NOTE — TELEPHONE ENCOUNTER
Refilled Voltaren 75mg bid as needed.    Can make appointment in the next 2 weeks and will determine new plan.

## 2018-10-01 NOTE — TELEPHONE ENCOUNTER
Pt is requesting medication to help with swelling; nothing seen in current med rec. Please enter order and e-rx accordingly; pt has been advised (via portal) to follow up with hematologists office to go over last lab results; and to please reach out to scheduling staff in the event he would like to make a follow up with us (for times and dates for pt convenience)

## 2019-01-14 ENCOUNTER — HOSPITAL ENCOUNTER (EMERGENCY)
Facility: HOSPITAL | Age: 34
Discharge: HOME | End: 2019-01-14
Attending: EMERGENCY MEDICINE
Payer: COMMERCIAL

## 2019-01-14 VITALS
RESPIRATION RATE: 20 BRPM | TEMPERATURE: 99 F | DIASTOLIC BLOOD PRESSURE: 82 MMHG | SYSTOLIC BLOOD PRESSURE: 155 MMHG | OXYGEN SATURATION: 96 % | HEART RATE: 86 BPM
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
## 2019-01-14 PROCEDURE — 99283 EMERGENCY DEPT VISIT LOW MDM: CPT

## 2019-01-14 PROCEDURE — 99282 PR EMERGENCY DEPT VISIT,LEVEL II: ICD-10-PCS | Mod: ,,, | Performed by: EMERGENCY MEDICINE

## 2019-01-14 PROCEDURE — 99282 EMERGENCY DEPT VISIT SF MDM: CPT | Mod: ,,, | Performed by: EMERGENCY MEDICINE

## 2019-01-14 RX ORDER — GABAPENTIN 100 MG/1
100 CAPSULE ORAL 3 TIMES DAILY
COMMUNITY
End: 2023-11-08
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
## 2020-10-05 ENCOUNTER — PATIENT MESSAGE (OUTPATIENT)
Dept: ADMINISTRATIVE | Facility: HOSPITAL | Age: 35
End: 2020-10-05

## 2021-01-04 ENCOUNTER — PATIENT MESSAGE (OUTPATIENT)
Dept: ADMINISTRATIVE | Facility: HOSPITAL | Age: 36
End: 2021-01-04

## 2021-04-05 ENCOUNTER — PATIENT MESSAGE (OUTPATIENT)
Dept: ADMINISTRATIVE | Facility: HOSPITAL | Age: 36
End: 2021-04-05

## 2021-04-15 ENCOUNTER — PATIENT MESSAGE (OUTPATIENT)
Dept: RESEARCH | Facility: HOSPITAL | Age: 36
End: 2021-04-15

## 2021-07-06 ENCOUNTER — PATIENT MESSAGE (OUTPATIENT)
Dept: ADMINISTRATIVE | Facility: HOSPITAL | Age: 36
End: 2021-07-06

## 2022-04-27 NOTE — TELEPHONE ENCOUNTER
----- Message from Ginette Sanchez MD sent at 9/10/2018  6:52 PM CDT -----  Please inform patient results are OK.       Mailed normal lab results....................   Chief complaint:   Chief Complaint   Patient presents with   • Follow-up     4mth- hld,htn,dm,vitamin d def       Vitals:  Visit Vitals  /72 (BP Location: LUE - Left upper extremity, Patient Position: Sitting, Cuff Size: Regular)   Pulse 80   Ht 5' 11\" (1.803 m)   Wt 77.6 kg (171 lb)   SpO2 97%   BMI 23.85 kg/m²       HISTORY OF PRESENT ILLNESS     Sanjiv is at clinic for a regularly scheduled visit to followup on;    Visit for preventive health examination  (primary encounter diagnosis)  Type 2 diabetes mellitus without complication, without long-term current use of insulin (CMS/HCC)  Hemochromatosis, hereditary (CMS/HCC)  Essential hypertension  Mixed hyperlipidemia  Screening for prostate cancer  Vitamin D deficiency  Agatston coronary artery calcium score greater than 400     He reports that his\"brain fog\" has cleared after trying a mushroom supplement recommended by Sherman Johnson a  for this purpose. He says his memory and fogginess improved after 3 days, has continued to use it.     He reports that his feet have been doing OK. Getting scar form around a small glass sliver he picked up last year that the podiatrist could not remove.   Feet otherwise stable.     Denies chest pain, [place], unusual fatigue or fatigability.     Denies problems with any of the other meds for HTN, HLD, etc. Had to change from Repatha to Praluent per insurance formulary. Had one phlebotomy early this year, getting over a cold so waiting to recover completely before next phlebotomy.       Other significant problems:  Patient Active Problem List    Diagnosis Date Noted   • Agatston coronary artery calcium score greater than 400 04/24/2019     Priority: High     5/14/2019: Negative REgadenoson NM stress test, EF= 71%  4/24/2019 Coronary calcium score = 904, 417 LAD, 301 CXA, 110 RCA     • Type II or unspecified type diabetes mellitus without mention of complication, not stated as uncontrolled 11/19/2012     Priority: Medium    • Hemochromatosis, hereditary (CMS/HCC) 11/15/2012     Priority: Medium   • Foreign body in right foot 04/14/2021     Priority: Low   • History of colon polyps 08/15/2017     Priority: Low   • SOWMYA (obstructive sleep apnea) 09/09/2015     Priority: Low     October 26 of 2016: Severe obstructive sleep apnea with an AHI of 108.5 per hour and desaturation reaching 67%. CPAP at 12 gave good control   Still using the CPAP     • Osteoarthritis 02/18/2015     Priority: Low     1/12/2015:  Dr. Jesus, [facility] [place]:  Right ankle osteoarthritis with previous plantar fibroma excisions, asymptomatic pseudogout.  Planned total ankle procedure.       • Vitamin D deficiency      Priority: Low   • Mixed hyperlipidemia 11/15/2012     Priority: Low     Sep 5, 2017: Negative stress test with 8+ mins exercise std nathalie.     • Essential hypertension 11/15/2012     Priority: Low   • Pseudogout 11/15/2012     Priority: Low       PAST MEDICAL, FAMILY AND SOCIAL HISTORY     Medications:  Current Outpatient Medications   Medication   • lisinopril (ZESTRIL) 20 MG tablet   • doxazosin (CARDURA) 4 MG tablet   • Alirocumab (Praluent) 150 MG/ML Solution Auto-injector   • sulindac (CLINORIL) 200 MG tablet   • diclofenac (VOLTAREN) 1 % gel   • Repatha SureClick 140 MG/ML injection   • cholecalciferol (cholecalciferol) 25 mcg (1,000 units) tablet   • MAGNESIUM PO   • FREESTYLE LITE strip   • Blood Glucose Monitoring Suppl (FREESTYLE FREEDOM LITE) W/DEVICE KIT     No current facility-administered medications for this visit.       Allergies:  ALLERGIES:   Allergen Reactions   • Rosuvastatin Other (See Comments)     Mental fogginess   • Atorvastatin INSOMNIA       Past Medical  History/Surgeries:  Past Medical History:   Diagnosis Date   • Diabetes mellitus (CMS/HCC)    • Essential (primary) hypertension    • Hemochromatosis    • Olecranon bursitis of left elbow    • Other and unspecified hyperlipidemia    • Pseudogout    • Sleep apnea      uses CPAP   • Vitamin D deficiency        Past Surgical History:   Procedure Laterality Date   • Anes fasciectomy exc plantar fascia; par Right 2000   • Ankle surgery  12/8/2015    Right TAA.  Dr. Bud Jesus.  [facility]   • Appendectomy  1995   • Cardiology - stress test  09/05/2017    Negative std stress test, 8+ mins std nathalie   • Colonoscopy  05/24/2021    1 polyp desc colon (leiomyoma) removed, Dr BANDAR Schmid at [facility]   • Colonoscopy diagnostic  2016    Done in [place], not sure where or what doctor but it was normal he says.   • Colonoscopy remove lesions by snare  11/18/2010   • Dupuytren contracture release Right 12/2019    Dr Ketan Velazquez, 5th finger right hand   • Gallbladder surgery  2008   • Hepatitis c antibody  03/19/2015    Negative   • Shoulder surgery Right 9/18/2015    arthroscopic rotator cuff repair   • Stress test, regadenoson w myocardial perfusion spect (multi study)  05/14/2019    negative for ischemia, EF= 71%   • Tonsillectomy and adenoidectomy  1965       Family History:  Family History   Problem Relation Age of Onset   • High blood pressure Mother    • Heart disease Mother 84        pacemaker   • Dementia/Alzheimers Mother 88        fatal at 90   • Patient is unaware of any medical problems Brother    • Diabetes Sister        Social History:  Social History     Tobacco Use   • Smoking status: Never Smoker   • Smokeless tobacco: Never Used   Substance Use Topics   • Alcohol use: Yes     Alcohol/week: 10.0 standard drinks     Types: 10 Standard drinks or equivalent per week     Comment: occ.       REVIEW OF SYSTEMS     Review of Systems   Constitutional: Negative for activity change, appetite change, fatigue and unexpected weight change.        Walks more in summer for exercise, less in winter. Right ankle implant has been less of a problem.    HENT: Negative for dental problem, hearing loss (got hearing aids 2021, good result so far.) and nosebleeds.    Eyes: Negative for visual  disturbance.        Last eye exam summer 2021.    Respiratory: Negative for chest tightness, shortness of breath and wheezing.    Cardiovascular: Negative for chest pain, palpitations and leg swelling.   Gastrointestinal: Negative for abdominal pain, blood in stool, constipation (Mg supplement) and diarrhea.        Colonoscopy May 2021 showed leiomyoma. Repeat date TBD.   Genitourinary: Negative for difficulty urinating, hematuria and urgency.        Denies nocturia   Musculoskeletal: Positive for arthralgias (stable chronic  hands, and feet pains. Would like another cortisone shot in the left knee today). Negative for neck pain and neck stiffness.         Hands, feet, ankles, shoulders, and knees. No worse than usual-better if anything. Did a lot of walking and climbing at [employer] and [employer] summer 2018.   Skin: Negative.  Negative for rash and wound.   Neurological: Negative for dizziness, speech difficulty, weakness, light-headedness and headaches.        Denies stroke-like spells. Had a trip and fall Sep 2020. None since. Brain fog seems to have lifted in early 2022 on a mushroom supplement. Some arm tremor developed since last visit. Father had parkinson.    Psychiatric/Behavioral: The patient is not nervous/anxious (denies depression).        PHYSICAL EXAM     Physical Exam  Constitutional:       General: He is not in acute distress.     Appearance: Normal appearance. He is well-developed and normal weight. He is not ill-appearing or diaphoretic.      Comments: Trim vigorous general appearance   HENT:      Head: Normocephalic and atraumatic.      Ears:      Comments: Normal conversational hearing, shallow complete bilateral diagonal earlobe creases.      Neck: Neck supple.   Eyes:      General: No scleral icterus.     Conjunctiva/sclera: Conjunctivae normal.      Comments: No ptosis     Neck:      Thyroid: No thyromegaly.      Vascular: No carotid bruit or JVD.      Trachea: No tracheal deviation.    Cardiovascular:      Rate and Rhythm: Normal rate and regular rhythm.      Pulses: Normal pulses.      Heart sounds: Murmur (2/6msm sternal border nonradiatingb) heard.     Gallop present.      Comments: PMI undisplaced.  Pulmonary:      Effort: Pulmonary effort is normal. No respiratory distress.      Breath sounds: No wheezing.   Abdominal:      General: Bowel sounds are normal. There is no distension.      Palpations: Abdomen is soft. There is no mass.      Tenderness: There is no abdominal tenderness.   Musculoskeletal:         General: No tenderness.      Right lower leg: No edema.      Left lower leg: No edema.      Comments: Dupuytren contractures on both little fingers and ring fingers. Antalgic gait, normal station.   Lymphadenopathy:      Cervical: No cervical adenopathy (No supraclavicular or cervical lymphadenopathy).   Skin:     General: Skin is warm and dry.      Findings: No rash.      Comments: With patient's shoes and socks off, visual inspection of the feet reveal   Right Foot: \"normal appearance with postsurgical ankle deformity\",\"reduced dorsal pedal pulses, good PT pulse \",\"hair growth on the dorsum\",\"nails groomed and normal\",\"normal sensation to monofilament testing\",\"no open skin areas\",\"no callus\" Does have extensive scarring from old surgery on the plantar surface.  Left Foot: \"normal appearance with ankle deformity\",\"normal pedal pulses \",\"hair growth on the dorsum\",\"nails groomed and normal\",\"normal sensation to monofilament testing\",\"no open skin areas\",\"no callus\"   Updated 12/14/2021   Neurological:      Mental Status: He is alert and oriented to person, place, and time. Mental status is at baseline.      Cranial Nerves: No cranial nerve deficit.      Comments: Slight cogwheeling LUE, none on right.    Psychiatric:         Mood and Affect: Mood normal.         Behavior: Behavior normal.       Wt Readings from Last 10 Encounters:   04/27/22 77.6 kg (171 lb)   12/14/21 80.2 kg (176 lb  12.8 oz)   05/27/21 77.1 kg (169 lb 15.6 oz)   05/24/21 77.1 kg (170 lb)   04/12/21 74.8 kg (165 lb)   04/01/21 76.4 kg (168 lb 6.4 oz)   11/23/20 74.8 kg (165 lb)   07/30/20 73.3 kg (161 lb 9.6 oz)   02/19/20 76.1 kg (167 lb 12.8 oz)   11/26/19 76.2 kg (167 lb 15.9 oz)     Recent Results (from the past 1008 hour(s))   COMPREHENSIVE METABOLIC PANEL    Collection Time: 04/19/22  7:07 AM   Result Value Ref Range    Fasting Status 12 0 - 999 Hours    Sodium 141 135 - 145 mmol/L    Potassium 4.1 3.4 - 5.1 mmol/L    Chloride 108 (H) 98 - 107 mmol/L    Carbon Dioxide 28 21 - 32 mmol/L    Anion Gap 9 (L) 10 - 20 mmol/L    Glucose 122 (H) 70 - 99 mg/dL    BUN 12 6 - 20 mg/dL    Creatinine 0.68 0.67 - 1.17 mg/dL    Glomerular Filtration Rate >90 >=60    BUN/ Creatinine Ratio 18 7 - 25    Calcium 8.6 8.4 - 10.2 mg/dL    Bilirubin, Total 0.8 0.2 - 1.0 mg/dL    GOT/AST 25 <=37 Units/L    GPT/ALT 32 <64 Units/L    Alkaline Phosphatase 108 45 - 117 Units/L    Albumin 3.8 3.6 - 5.1 g/dL    Protein, Total 6.3 (L) 6.4 - 8.2 g/dL    Globulin 2.5 2.0 - 4.0 g/dL    A/G Ratio 1.5 1.0 - 2.4   GLYCOHEMOGLOBIN    Collection Time: 04/19/22  7:07 AM   Result Value Ref Range    Hemoglobin A1C 5.0 4.5 - 5.6 %   LIPID PANEL WITH REFLEX    Collection Time: 04/19/22  7:07 AM   Result Value Ref Range    Fasting Status      Cholesterol 68 <=199 mg/dL    Triglycerides 65 <=149 mg/dL    HDL 39 (L) >=40 mg/dL    LDL 16 <=129 mg/dL    Non-HDL Cholesterol 29 mg/dL    Cholesterol/ HDL Ratio 1.7 <=4.4   FERRITIN    Collection Time: 04/19/22  7:07 AM   Result Value Ref Range    Ferritin 74 26 - 388 ng/mL   IRON AND TOTAL IRON BINDING CAPACITY    Collection Time: 04/19/22  7:07 AM   Result Value Ref Range    Iron 125 65 - 175 mcg/dL    Iron Binding Capacity 229 (L) 250 - 450 mcg/dL    Iron, Percent Saturation 55 (H) 15 - 45 %   CBC WITH AUTOMATED DIFFERENTIAL (PERFORMABLE ONLY)    Collection Time: 04/19/22  7:07 AM   Result Value Ref Range    WBC 3.8 (L)  4.2 - 11.0 K/mcL    RBC 4.17 (L) 4.50 - 5.90 mil/mcL    HGB 14.8 13.0 - 17.0 g/dL    HCT 41.0 39.0 - 51.0 %    MCV 98.3 78.0 - 100.0 fl    MCH 35.5 (H) 26.0 - 34.0 pg    MCHC 36.1 32.0 - 36.5 g/dL    RDW-CV 12.2 11.0 - 15.0 %    RDW-SD 43.9 39.0 - 50.0 fL     (L) 140 - 450 K/mcL    NRBC 0 <=0 /100 WBC    Neutrophil, Percent 51 %    Lymphocytes, Percent 27 %    Mono, Percent 16 %    Eosinophils, Percent 5 %    Basophils, Percent 1 %    Immature Granulocytes 0 %    Absolute Neutrophils 1.9 1.8 - 7.7 K/mcL    Absolute Lymphocytes 1.0 1.0 - 4.0 K/mcL    Absolute Monocytes 0.6 0.3 - 0.9 K/mcL    Absolute Eosinophils  0.2 0.0 - 0.5 K/mcL    Absolute Basophils 0.0 0.0 - 0.3 K/mcL    Absolute Immmature Granulocytes 0.0 0.0 - 0.2 K/mcL   ]     ASSESSMENT/PLAN     Visit for preventive health examination  (primary encounter diagnosis)  Reviewed and updated medical, surgical, family, social, and immunization histories.   Recommend Covid booster 6 mos after the last shot. Consider referral to neurology for the memory issues and the tremor.     Type 2 diabetes mellitus without complication, without long-term current use of insulin (CMS/HCC)  Stable well controlled  Plan diet and exercise    Hemochromatosis, hereditary (CMS/HCC)  Stable, better iron saturation   Continue phlebotomy twice yearly    Essential hypertension  Stable well controlled  Plan continue lisinopril and doxazosin    Mixed hyperlipidemia  Stable well controlled  Plan continue Praluent    Screening for prostate cancer  Due for psa next visit    Vitamin D deficiency  Due for vitamin D check next visit      Agatston coronary artery calcium score greater than 400  Stable  Plan continue Praluent    RTC 4 mos and retest lab.

## 2022-07-15 NOTE — ED NOTES
Dr. Caraballo at bedside  
Patient identifiers verified and correct.  LOC: The patient is awake, alert and aware of environment with an appropriate affect, the patient is oriented x 3 and speaking appropriately.   APPEARANCE: Patient appears comfortable and in no acute distress, patient is clean and well groomed.  SKIN: The skin is warm and dry, color consistent with ethnicity, patient has normal skin turgor and WDL.   MUSCULOSKELETAL: Patient moving all extremities spontaneously, no swelling noted. Pt c/o L inner thigh pain.  RESPIRATORY: Airway is open and patent, respirations are spontaneous, patient has a normal effort and rate, no accessory muscle use noted.  CARDIAC: Pt has normal R&R, cap refill <3 sec.  ABDOMEN: Pt denies any changes in BM. Abd WDL.  : Pt denies frequency or burning with urination.  NEURO: PERRL, opens eyes spontaneously, equal bilateral hand strength, follows commands, equal facial symmetry, normal sensation in all extremities when touched with a finger.          Pt identity confirmed; pt educated on course of action in and purpose of intake area; chair in lowest position and adjusted for maximum comfort; pt provided w/ remote and educated on its use, as well as to not vertically elevate chair without a member of the staff present; pt advised to call for assistance when needed; pt educated on reasons for holding PO intake until lab work and imaging have resulted; Pt informed on nearest restroom's location; pt verbalized understanding & agreed     
Yes

## 2023-06-12 ENCOUNTER — OFFICE VISIT (OUTPATIENT)
Dept: UROLOGY | Facility: CLINIC | Age: 38
End: 2023-06-12
Payer: COMMERCIAL

## 2023-06-12 VITALS
BODY MASS INDEX: 40.03 KG/M2 | SYSTOLIC BLOOD PRESSURE: 125 MMHG | HEART RATE: 75 BPM | HEIGHT: 68 IN | WEIGHT: 264.13 LBS | DIASTOLIC BLOOD PRESSURE: 84 MMHG

## 2023-06-12 DIAGNOSIS — Z98.52 VASECTOMY STATUS: Primary | ICD-10-CM

## 2023-06-12 PROCEDURE — 1159F PR MEDICATION LIST DOCUMENTED IN MEDICAL RECORD: ICD-10-PCS | Mod: CPTII,S$GLB,, | Performed by: UROLOGY

## 2023-06-12 PROCEDURE — 3074F SYST BP LT 130 MM HG: CPT | Mod: CPTII,S$GLB,, | Performed by: UROLOGY

## 2023-06-12 PROCEDURE — 99999 PR PBB SHADOW E&M-EST. PATIENT-LVL III: ICD-10-PCS | Mod: PBBFAC,,, | Performed by: UROLOGY

## 2023-06-12 PROCEDURE — 99999 PR PBB SHADOW E&M-EST. PATIENT-LVL III: CPT | Mod: PBBFAC,,, | Performed by: UROLOGY

## 2023-06-12 PROCEDURE — 3074F PR MOST RECENT SYSTOLIC BLOOD PRESSURE < 130 MM HG: ICD-10-PCS | Mod: CPTII,S$GLB,, | Performed by: UROLOGY

## 2023-06-12 PROCEDURE — 99204 OFFICE O/P NEW MOD 45 MIN: CPT | Mod: S$GLB,,, | Performed by: UROLOGY

## 2023-06-12 PROCEDURE — 99204 PR OFFICE/OUTPT VISIT, NEW, LEVL IV, 45-59 MIN: ICD-10-PCS | Mod: S$GLB,,, | Performed by: UROLOGY

## 2023-06-12 PROCEDURE — 3008F PR BODY MASS INDEX (BMI) DOCUMENTED: ICD-10-PCS | Mod: CPTII,S$GLB,, | Performed by: UROLOGY

## 2023-06-12 PROCEDURE — 1159F MED LIST DOCD IN RCRD: CPT | Mod: CPTII,S$GLB,, | Performed by: UROLOGY

## 2023-06-12 PROCEDURE — 3079F PR MOST RECENT DIASTOLIC BLOOD PRESSURE 80-89 MM HG: ICD-10-PCS | Mod: CPTII,S$GLB,, | Performed by: UROLOGY

## 2023-06-12 PROCEDURE — 3008F BODY MASS INDEX DOCD: CPT | Mod: CPTII,S$GLB,, | Performed by: UROLOGY

## 2023-06-12 PROCEDURE — 3079F DIAST BP 80-89 MM HG: CPT | Mod: CPTII,S$GLB,, | Performed by: UROLOGY

## 2023-06-12 RX ORDER — DIAZEPAM 10 MG/1
10 TABLET ORAL
Qty: 1 TABLET | Refills: 0 | Status: SHIPPED | OUTPATIENT
Start: 2023-06-12 | End: 2023-11-08 | Stop reason: ALTCHOICE

## 2023-06-12 RX ORDER — BUPRENORPHINE AND NALOXONE 8; 2 MG/1; MG/1
FILM, SOLUBLE BUCCAL; SUBLINGUAL
COMMUNITY
Start: 2023-05-29

## 2023-06-12 NOTE — PROGRESS NOTES
[facility]   Clinic Note    SUBJECTIVE:     Chief Complaint: vasectomy evaluation    Referral from: No ref. provider found.    History of Present Illness:  Luca Story is a 37 y.o. male who presents to clinic for evaluation for a vasectomy.    Patient states that he currently has 2 children. He is  . He desires sterilization. He denies any recent hematuria or dysuria. No complaints of testicular pain.    Patient endorses no additional complaints at this time.    Previous scrotal surgery: no    Anticoagulation/Antiplatelets:  No    Past Medical History:   Diagnosis Date    Anemia     Diverticulitis     Diverticulitis     GERD (gastroesophageal reflux disease)        Past Surgical History:   Procedure Laterality Date    COLONOSCOPY N/A 8/1/2018    Procedure: COLONOSCOPY;  Surgeon: Alex Jackson MD;  Location: [facility] ([address] [place] [address]);  Service: Endoscopy;  Laterality: N/A;    ESOPHAGOGASTRODUODENOSCOPY N/A 8/1/2018    Procedure: EGD (ESOPHAGOGASTRODUODENOSCOPY);  Surgeon: Alex Jackson MD;  Location: [facility] ([address] [place] [address]);  Service: Endoscopy;  Laterality: N/A;  Case said first available; Pt okay with f/a; Dr. 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      diclofenac (VOLTAREN) 75 MG EC tablet Take 1 tablet (75 mg total) by mouth 2 (two) times daily as needed. (Patient not taking: Reported on 6/12/2023) 40 tablet 0    flu vac [id number] 36mos up,PF, 60 mcg (15 mcg x 4)/0.5 mL Syrg inject into the muscle (Patient not taking: Reported on 6/12/2023) 0.5 mL 0    gabapentin (NEURONTIN) 100 MG capsule Take 100 mg by mouth 3 (three) times daily.      omeprazole (PRILOSEC OTC) 20 MG tablet Take 1 tablet (20 mg total) by mouth once daily. 30 tablet 0     No current facility-administered medications on file prior to visit.       Review of patient's allergies indicates:  No Known Allergies    Review of Systems:  A review of 10+ systems was conducted with pertinent positive and negative findings documented in HPI with all other systems reviewed and negative.    OBJECTIVE:     Estimated body mass index is 40.16 kg/m² as calculated from the following:    Height as of this encounter: 5' 8" (1.727 m).    Weight as of this encounter: 119.8 kg (264 lb 1.8 oz).    Vital Signs (Most Recent)  Vitals:    06/12/23 0856   BP: 125/84   Pulse: 75       Physical Exam:  GENERAL: patient sitting comfortably  HEENT: normocephalic  NECK: supple, no JVD  PULM: normal chest rise, no increased WOB  HEART: non-diaphoretic  ABDO: soft, nondistended, nontender  BACK: no CVA tenderness bilaterally  SKIN: warm, dry, well perfused  EXT: no bruising or edema  NEURO: grossly normal with no focal deficits  PSYCH: appropriate mood and affect    Genitourinary Exam:  circumcised phallus with orthotopic meatus without lesions.    Bilaterally descended testes in normal position and lie without appreciable masses.    Scrotum without erythema, lesions, or masses.    Vas deferens easily palpable bilaterally    Lab Results   Component Value Date    BUN 23 09/06/2018    CREATININE 0.7 09/06/2018    WBC 12.0 09/06/2018    HGB 9.3 (A) 09/06/2018    HCT 28 (A) 09/06/2018     (A) 09/06/2018    " AST 10 (A) 09/06/2018    ALT 15 09/06/2018    ALKPHOS 76 09/06/2018    ALBUMIN 4.2 09/06/2018    INR 1.0 09/06/2018        ASSESSMENT     Vasectomy Evaluation    PLAN:     Vasectomy Evaluation    - The patient desires a vasectomy. We discussed that vasectomy is intended to be a permanent form of contraception. Vasectomy also does not produce immediate sterility.    - Following vasectomy, another form of contraception is required until vas occlusion is confirmed by post-vasectomy semen analysis (PVSA), which will be completed 3 months after the vasectomy is performed.    - Even after vas occlusion is confirmed, vasectomy is not 100% reliable in preventing pregnancy. The risk of pregnancy after vasectomy is approximately 1 in 2,000 for men who have post-vasectomy azoospermia or PVSA showing rare non-motile sperm (RNMS). Repeat vasectomy is necessary in ?1% of vasectomies, provided that a technique for vas occlusion known to have a low occlusive failure rate has been used.    - Patients should refrain from ejaculation for approximately one week after vasectomy.    - Options for fertility after vasectomy include vasectomy reversal and sperm retrieval with in vitro fertilization. These options are not always successful, and they may be expensive.    - The rates of surgical complications such as symptomatic hematoma and infection are 1-2%. These rates vary with the surgeon's experience and the criteria used to diagnose these conditions.    - Chronic scrotal pain associated with negative impact on quality of life occurs after vasectomy in about 1-2% of men. Few of these men require additional surgery.    - Other permanent and non-permanent alternatives to vasectomy are available.    - The patient had an opportunity to ask questions. Risks, benefits, and all alternative options were discussed. All of his concerns were addressed.    - We will plan to proceed with vasectomy in the clinic. He will be prescribed one tablet of  10mg Valium to take before the procedure, as well as pain medication to be taken after the procedure.     Rolf Napier MD  Urology  [facility] [place]    Disclaimer: This note has been generated using voice-recognition software. There may be typographical errors that have been missed during proof-reading.

## 2023-07-19 ENCOUNTER — OFFICE VISIT (OUTPATIENT)
Dept: UROLOGY | Facility: CLINIC | Age: 38
End: 2023-07-19
Payer: COMMERCIAL

## 2023-07-19 ENCOUNTER — TELEPHONE (OUTPATIENT)
Dept: UROLOGY | Facility: CLINIC | Age: 38
End: 2023-07-19
Payer: COMMERCIAL

## 2023-07-19 VITALS
DIASTOLIC BLOOD PRESSURE: 89 MMHG | BODY MASS INDEX: 40.09 KG/M2 | HEART RATE: 91 BPM | WEIGHT: 264.56 LBS | HEIGHT: 68 IN | SYSTOLIC BLOOD PRESSURE: 123 MMHG | OXYGEN SATURATION: 96 %

## 2023-07-19 DIAGNOSIS — Z98.52 VASECTOMY STATUS: ICD-10-CM

## 2023-07-19 PROCEDURE — 99999 PR PBB SHADOW E&M-EST. PATIENT-LVL II: ICD-10-PCS | Mod: PBBFAC,,, | Performed by: UROLOGY

## 2023-07-19 PROCEDURE — 88302 PR  SURG PATH,LEVEL II: ICD-10-PCS | Mod: 26,,, | Performed by: PATHOLOGY

## 2023-07-19 PROCEDURE — 88302 TISSUE EXAM BY PATHOLOGIST: CPT | Mod: 59 | Performed by: PATHOLOGY

## 2023-07-19 PROCEDURE — 3008F PR BODY MASS INDEX (BMI) DOCUMENTED: ICD-10-PCS | Mod: CPTII,S$GLB,, | Performed by: UROLOGY

## 2023-07-19 PROCEDURE — 3079F PR MOST RECENT DIASTOLIC BLOOD PRESSURE 80-89 MM HG: ICD-10-PCS | Mod: CPTII,S$GLB,, | Performed by: UROLOGY

## 2023-07-19 PROCEDURE — 3074F SYST BP LT 130 MM HG: CPT | Mod: CPTII,S$GLB,, | Performed by: UROLOGY

## 2023-07-19 PROCEDURE — 99499 NO LOS: ICD-10-PCS | Mod: S$GLB,,, | Performed by: UROLOGY

## 2023-07-19 PROCEDURE — 3074F PR MOST RECENT SYSTOLIC BLOOD PRESSURE < 130 MM HG: ICD-10-PCS | Mod: CPTII,S$GLB,, | Performed by: UROLOGY

## 2023-07-19 PROCEDURE — 99999 PR PBB SHADOW E&M-EST. PATIENT-LVL II: CPT | Mod: PBBFAC,,, | Performed by: UROLOGY

## 2023-07-19 PROCEDURE — 55250 REMOVAL OF SPERM DUCT(S): CPT | Mod: S$GLB,,, | Performed by: UROLOGY

## 2023-07-19 PROCEDURE — 99499 UNLISTED E&M SERVICE: CPT | Mod: S$GLB,,, | Performed by: UROLOGY

## 2023-07-19 PROCEDURE — 3008F BODY MASS INDEX DOCD: CPT | Mod: CPTII,S$GLB,, | Performed by: UROLOGY

## 2023-07-19 PROCEDURE — 3079F DIAST BP 80-89 MM HG: CPT | Mod: CPTII,S$GLB,, | Performed by: UROLOGY

## 2023-07-19 PROCEDURE — 88302 TISSUE EXAM BY PATHOLOGIST: CPT | Mod: 26,,, | Performed by: PATHOLOGY

## 2023-07-19 PROCEDURE — 55250 PR REMOVAL OF SPERM DUCT(S): ICD-10-PCS | Mod: S$GLB,,, | Performed by: UROLOGY

## 2023-07-19 NOTE — TELEPHONE ENCOUNTER
Spoke with patient, instruction sheet mentions an ABX tablet to take prior to procedure.  Patient to disregard ABX prep, was given valium to take prior to procedure.  If MD deem ABX medically necessary, can administer at visit.  Patient voiced understanding.

## 2023-07-19 NOTE — PROGRESS NOTES
[facility]  Procedure Note       Surgery Date: 07/19/2023     Pre-op Diagnosis:    Vasectomy status    Post-op Diagnosis:    Vasectomy Status    Procedure Performed:   Bilateral Vasectomy    Staff Surgeon:   Rolf Napier MD    Assistant Surgeon:   None    Anesthesia:   Local    Estimated Blood Loss:   Minimal         Specimens:   Right Vas Deferens  Left Vas Deferens    Drains/Implants:   None    Complications:   None    Indications:   Patient is a 37 y.o. year old male who presented to clinic desiring sterilization with vasectomy. The risks and benefits of the procedure were discussed in the clinic. All of his questions were answered. He signed consents and elected to proceed with bilateral vasectomy, which was then scheduled for today.    Description of the Procedure:   The patient was brought to the procedure suite and placed in supine position. He was then prepped in standard sterile fashion. Time-out was performed.    I started by performing a block by injecting local anesthetic along the vas on the left side. I then injected local anesthetic along the skin on the left side over the vas and raised a wheel.  A sharp dissector was used to open a 1 cm skin incision over the left vas.  The left vas deferens was then grasped with a ringed vas clamp.  The left vas was then externalized to the incision.  The overlying fascial attachments were dissected off using a combination of electrocautery and sharp dissection.  Once this was completed the vas was completely exposed.  A sharp clamp was used to develop a space underneath the vas but above the overlying small vessels.  A clamp was applied proximally and distally.  Titanium clips were placed proximally and distally at the base of the clamp.  The vas was then divided sharply and a small segment was sent for pathology.  Hemostasis was achieved with electrocautery.  Once there was good hemostasis the proximal end was first released and a tissue interposition  with the dartos fascia was performed over the proximal end using a chromic suture.  The distal end was then released into the incision. The skin was closed with a chromic suture in interrupted fashion.    An identical procedure was performed on the patient's right side. I started by performing a block by injecting local anesthetic along the vas on the right side.  I then injected local anesthetic along the skin on the right side over the vas and raised a wheel.  A sharp dissector was used to open a 1 cm skin incision over the vas.  The right vas deferens was then grasped with a ringed vas clamp.  The right vas was then externalized through the incision.  The overlying fascial attachments were dissected off using a combination of electrocautery and sharp dissection.  Once this was completed the vas was completely exposed.  A sharp clamp was used to develop a space underneath the vas but above the overlying small vessels.  A clamp was applied proximally and distally.  Titanium clips were placed proximally and distally at the base of the clamp.  The vas was then divided sharply and a small segment was sent for pathology.  Hemostasis was achieved with electrocautery.  Once there was good hemostasis the proximal end was first released and a tissue interposition with the dartos fascia was performed over the proximal end using a chromic suture.  The distal end was then released into the incision. The skin was closed with a chromic suture in interrupted fashion.    The wounds was then cleaned and dried. Scrotal support was then applied. Bacitracin ointment was applied.    All needle and lap counts were correct. The patient tolerated the procedure well. No complications occurred during or immediately after the procedure. The patient was taken to the PACU satisfactory condition.     Disposition: He will have a post procedural semen analysis performed in 3 months. Patient counseled to continue using contraception until after a  semen analysis confirms that he is sterile.     Rolf Napier MD  Urology  [facility] [place]

## 2023-07-19 NOTE — TELEPHONE ENCOUNTER
----- Message from Jhoana Mark sent at 7/19/2023  8:25 AM CDT -----  Regarding: Pt Advice / Procedure  Contact: [facility] [phone]  Pt is calling regarding procedure @ 1pm. Pt has questions and concerns about the procedure. Please Call

## 2023-07-24 LAB
FINAL PATHOLOGIC DIAGNOSIS: NORMAL
GROSS: NORMAL
Lab: NORMAL

## 2023-07-29 ENCOUNTER — PATIENT MESSAGE (OUTPATIENT)
Dept: UROLOGY | Facility: CLINIC | Age: 38
End: 2023-07-29
Payer: COMMERCIAL

## 2023-09-14 ENCOUNTER — OFFICE VISIT (OUTPATIENT)
Dept: INTERNAL MEDICINE | Facility: CLINIC | Age: 38
End: 2023-09-14
Payer: COMMERCIAL

## 2023-09-14 ENCOUNTER — TELEPHONE (OUTPATIENT)
Dept: INTERNAL MEDICINE | Facility: CLINIC | Age: 38
End: 2023-09-14
Payer: COMMERCIAL

## 2023-09-14 VITALS
WEIGHT: 269.19 LBS | TEMPERATURE: 99 F | SYSTOLIC BLOOD PRESSURE: 120 MMHG | DIASTOLIC BLOOD PRESSURE: 80 MMHG | OXYGEN SATURATION: 97 % | HEIGHT: 68 IN | HEART RATE: 94 BPM | BODY MASS INDEX: 40.8 KG/M2

## 2023-09-14 DIAGNOSIS — K21.9 GASTROESOPHAGEAL REFLUX DISEASE, UNSPECIFIED WHETHER ESOPHAGITIS PRESENT: Primary | ICD-10-CM

## 2023-09-14 DIAGNOSIS — R19.7 ACUTE DIARRHEA: ICD-10-CM

## 2023-09-14 DIAGNOSIS — R14.0 GASSINESS: ICD-10-CM

## 2023-09-14 DIAGNOSIS — Z00.00 ANNUAL PHYSICAL EXAM: Primary | ICD-10-CM

## 2023-09-14 PROCEDURE — 3074F PR MOST RECENT SYSTOLIC BLOOD PRESSURE < 130 MM HG: ICD-10-PCS | Mod: CPTII,S$GLB,, | Performed by: STUDENT IN AN ORGANIZED HEALTH CARE EDUCATION/TRAINING PROGRAM

## 2023-09-14 PROCEDURE — 99999 PR PBB SHADOW E&M-EST. PATIENT-LVL V: CPT | Mod: PBBFAC,,, | Performed by: STUDENT IN AN ORGANIZED HEALTH CARE EDUCATION/TRAINING PROGRAM

## 2023-09-14 PROCEDURE — 3074F SYST BP LT 130 MM HG: CPT | Mod: CPTII,S$GLB,, | Performed by: STUDENT IN AN ORGANIZED HEALTH CARE EDUCATION/TRAINING PROGRAM

## 2023-09-14 PROCEDURE — 3079F PR MOST RECENT DIASTOLIC BLOOD PRESSURE 80-89 MM HG: ICD-10-PCS | Mod: CPTII,S$GLB,, | Performed by: STUDENT IN AN ORGANIZED HEALTH CARE EDUCATION/TRAINING PROGRAM

## 2023-09-14 PROCEDURE — 3008F PR BODY MASS INDEX (BMI) DOCUMENTED: ICD-10-PCS | Mod: CPTII,S$GLB,, | Performed by: STUDENT IN AN ORGANIZED HEALTH CARE EDUCATION/TRAINING PROGRAM

## 2023-09-14 PROCEDURE — 3079F DIAST BP 80-89 MM HG: CPT | Mod: CPTII,S$GLB,, | Performed by: STUDENT IN AN ORGANIZED HEALTH CARE EDUCATION/TRAINING PROGRAM

## 2023-09-14 PROCEDURE — 3008F BODY MASS INDEX DOCD: CPT | Mod: CPTII,S$GLB,, | Performed by: STUDENT IN AN ORGANIZED HEALTH CARE EDUCATION/TRAINING PROGRAM

## 2023-09-14 PROCEDURE — 1159F PR MEDICATION LIST DOCUMENTED IN MEDICAL RECORD: ICD-10-PCS | Mod: CPTII,S$GLB,, | Performed by: STUDENT IN AN ORGANIZED HEALTH CARE EDUCATION/TRAINING PROGRAM

## 2023-09-14 PROCEDURE — 99999 PR PBB SHADOW E&M-EST. PATIENT-LVL V: ICD-10-PCS | Mod: PBBFAC,,, | Performed by: STUDENT IN AN ORGANIZED HEALTH CARE EDUCATION/TRAINING PROGRAM

## 2023-09-14 PROCEDURE — 1160F RVW MEDS BY RX/DR IN RCRD: CPT | Mod: CPTII,S$GLB,, | Performed by: STUDENT IN AN ORGANIZED HEALTH CARE EDUCATION/TRAINING PROGRAM

## 2023-09-14 PROCEDURE — 99204 PR OFFICE/OUTPT VISIT, NEW, LEVL IV, 45-59 MIN: ICD-10-PCS | Mod: S$GLB,,, | Performed by: STUDENT IN AN ORGANIZED HEALTH CARE EDUCATION/TRAINING PROGRAM

## 2023-09-14 PROCEDURE — 99204 OFFICE O/P NEW MOD 45 MIN: CPT | Mod: S$GLB,,, | Performed by: STUDENT IN AN ORGANIZED HEALTH CARE EDUCATION/TRAINING PROGRAM

## 2023-09-14 PROCEDURE — 1160F PR REVIEW ALL MEDS BY PRESCRIBER/CLIN PHARMACIST DOCUMENTED: ICD-10-PCS | Mod: CPTII,S$GLB,, | Performed by: STUDENT IN AN ORGANIZED HEALTH CARE EDUCATION/TRAINING PROGRAM

## 2023-09-14 PROCEDURE — 1159F MED LIST DOCD IN RCRD: CPT | Mod: CPTII,S$GLB,, | Performed by: STUDENT IN AN ORGANIZED HEALTH CARE EDUCATION/TRAINING PROGRAM

## 2023-09-14 RX ORDER — FAMOTIDINE 40 MG/1
40 TABLET, FILM COATED ORAL DAILY
Qty: 90 TABLET | Refills: 3 | Status: SHIPPED | OUTPATIENT
Start: 2023-09-14 | End: 2024-09-13

## 2023-09-14 RX ORDER — PANTOPRAZOLE SODIUM 40 MG/1
40 TABLET, DELAYED RELEASE ORAL 2 TIMES DAILY
Qty: 180 TABLET | Refills: 3 | Status: CANCELLED | OUTPATIENT
Start: 2023-09-14 | End: 2024-09-13

## 2023-09-14 RX ORDER — CHOLESTYRAMINE 4 G/4.8G
1 POWDER, FOR SUSPENSION ORAL 2 TIMES DAILY PRN
Qty: 20 PACKET | Refills: 0 | Status: SHIPPED | OUTPATIENT
Start: 2023-09-14 | End: 2023-11-08

## 2023-09-14 NOTE — PROGRESS NOTES
SUBJECTIVE     Chief Complaint   Patient presents with    Gas    Gastroesophageal Reflux       HPI  Luca Story is a 37 y.o. male with  GERD, h/o diverticulitis  that presents for same-day urgent care evaluation of gas, GERD.     This is a new patient to me but established to [facility].    Episode of gas last night with increased belching with malodorous gas.   Symptoms associated with diarrhea described as watery bowel movements every other hour. No melena or hematochezia. No relief with imodium.   Has some abdominal cramping that resolves with having bowel movements.   Ate barbecue brisket sandwich prior to onset of symptoms.   No fevers, chills, nausea, vomiting.   Daughter was vomiting all night, but she did not have same food as patient.   Has reflux, is constant if he does not take Omeprazole 60 mg daily.   Prior EGD in 2018 with irregular z-line, biopsies were normal. Colonoscopy at that time with one polyp with normal pathology.         PAST MEDICAL HISTORY:  Past Medical History:   Diagnosis Date    Anemia     Diverticulitis     Diverticulitis     GERD (gastroesophageal reflux disease)        PAST SURGICAL HISTORY:  Past Surgical History:   Procedure Laterality Date    COLONOSCOPY N/A 8/1/2018    Procedure: COLONOSCOPY;  Surgeon: Alex Jackson MD;  Location: [facility] ([address] [place] [address]);  Service: Endoscopy;  Laterality: N/A;    ESOPHAGOGASTRODUODENOSCOPY N/A 8/1/2018    Procedure: EGD (ESOPHAGOGASTRODUODENOSCOPY);  Surgeon: Alex Jackson MD;  Location: [address]);  Service: Endoscopy;  Laterality: N/A;  Case said first available; Pt okay with f/a; Dr. Garcia's next available is 8/25, pt wanting sooner    KNEE ARTHROSCOPY Right     KNEE SURGERY Left     ACL       FAMILY HISTORY:  Family History   Problem Relation Age of Onset    Rheum arthritis Mother     Diabetes Maternal Grandmother     Cancer Maternal Grandmother     Cirrhosis Maternal Uncle     Crohn's disease Neg Hx     Ulcerative colitis  "Neg Hx     Celiac disease Neg Hx        ALLERGIES AND MEDICATIONS: updated and reviewed.  Review of patient's allergies indicates:  No Known Allergies  Current Outpatient Medications   Medication Sig Dispense Refill    buprenorphine-naloxone 8-2 mg (SUBOXONE) 8-2 mg Place under the tongue.      diclofenac (VOLTAREN) 75 MG EC tablet Take 1 tablet (75 mg total) by mouth 2 (two) times daily as needed. 40 tablet 0    flu vac [id number] 36mos up,PF, 60 mcg (15 mcg x 4)/0.5 mL Syrg inject into the muscle 0.5 mL 0    gabapentin (NEURONTIN) 100 MG capsule Take 100 mg by mouth 3 (three) times daily.      diazePAM (VALIUM) 10 MG Tab Take 1 tablet (10 mg total) by mouth On call Procedure. 1 tablet 0    omeprazole (PRILOSEC OTC) 20 MG tablet Take 1 tablet (20 mg total) by mouth once daily. 30 tablet 0     No current facility-administered medications for this visit.       ROS  Review of Systems   Constitutional:  Negative for activity change, chills and fever.   HENT:  Negative for congestion and hearing loss.    Eyes:  Negative for pain and visual disturbance.   Respiratory:  Negative for cough and shortness of breath.    Cardiovascular:  Negative for chest pain and palpitations.   Gastrointestinal:  Positive for diarrhea. Negative for abdominal distention, abdominal pain, anal bleeding, blood in stool, constipation, nausea and vomiting.   Endocrine: Negative.    Genitourinary: Negative.    Musculoskeletal:  Negative for arthralgias and myalgias.   Skin: Negative.    Allergic/Immunologic: Negative.    Neurological:  Negative for dizziness, light-headedness and headaches.   Hematological: Negative.          OBJECTIVE     Physical Exam  Vitals:    09/14/23 1512   BP: 120/80   Pulse: 94   Temp: 98.8 °F (37.1 °C)    Body mass index is 40.93 kg/m².  Weight: 122.1 kg (269 lb 2.9 oz)   Height: 5' 8" (172.7 cm)     Physical Exam  Vitals reviewed.   Constitutional:       General: He is not in acute distress.     Appearance: Normal " appearance. He is obese.   HENT:      Head: Normocephalic and atraumatic.      Mouth/Throat:      Mouth: Mucous membranes are moist.      Pharynx: Oropharynx is clear.   Eyes:      Extraocular Movements: Extraocular movements intact.      Conjunctiva/sclera: Conjunctivae normal.      Pupils: Pupils are equal, round, and reactive to light.   Cardiovascular:      Rate and Rhythm: Normal rate and regular rhythm.      Pulses: Normal pulses.      Heart sounds: Normal heart sounds.   Pulmonary:      Effort: Pulmonary effort is normal.      Breath sounds: Normal breath sounds.   Abdominal:      General: Bowel sounds are normal. There is no distension.      Palpations: Abdomen is soft.      Tenderness: There is abdominal tenderness (very mild tenderness with deep palpation) in the left lower quadrant. There is no guarding or rebound. Negative signs include Poole's sign.   Musculoskeletal:         General: Normal range of motion.      Cervical back: Normal range of motion and neck supple.   Skin:     General: Skin is warm and dry.   Neurological:      General: No focal deficit present.      Mental Status: He is alert.           Health Maintenance         Date Due Completion Date    COVID-19 Vaccine (1) Never done ---    Pneumococcal Vaccines (Age 0-64) (1 - PCV) Never done ---    TETANUS VACCINE Never done ---    Hemoglobin A1c (Diabetic Prevention Screening) Never done ---    Lipid Panel 08/07/2023 8/7/2018    Influenza Vaccine (1) 09/01/2023 10/1/2018              ASSESSMENT     37 y.o. male with     1. Gastroesophageal reflux disease, unspecified whether esophagitis present    2. Acute diarrhea    3. Gassiness        PLAN:     1. Gastroesophageal reflux disease, unspecified whether esophagitis present  - Continue Omeprazole, but counseled at length to avoid supra-therapeutic doses. Recommend 40 mg qd. Can take famotidine as well.   - Given continued symptoms despite high doses and prior EGD findings, will refer to GI.    - Counseled at length about dietary and lifestyle changes.   - Ambulatory referral/consult to Gastroenterology; Future  - famotidine (PEPCID) 40 MG tablet; Take 1 tablet (40 mg total) by mouth once daily.  Dispense: 90 tablet; Refill: 3    2. Acute diarrhea  - Rest of care as above.   - cholestyramine-aspartame (CHOLESTYRAMINE LIGHT) 4 gram PwPk; Take 1 packet (4 g total) by mouth 2 (two) times daily as needed (diarrhea).  Dispense: 20 packet; Refill: 0    3. Gassiness  - Likely secondary to GERD, care as above.         RTC PRN       Rashaad Briseno MD  Family Medicine  [facility]  09/14/2023          No follow-ups on file.

## 2023-09-14 NOTE — PATIENT INSTRUCTIONS
Take Omeprazole 40 mg daily.   Take Famotidine 40 mg daily.   See Gastroenterologist.   Follow up with your PCP.     Avoid fried, spicy, acidic foods  Avoid caffeine  Avoid carbonated beverages  Avoid alcohol  Avoid large meals  Avoid eating within 2 hours exercise or 2 hours prior to sleep  Small, frequent meals are best   Solsberry foods are better tolerated  Fish or chicken, baked or broiled

## 2023-09-21 DIAGNOSIS — R19.7 ACUTE DIARRHEA: ICD-10-CM

## 2023-09-21 DIAGNOSIS — K21.9 GASTROESOPHAGEAL REFLUX DISEASE, UNSPECIFIED WHETHER ESOPHAGITIS PRESENT: Primary | ICD-10-CM

## 2023-09-21 NOTE — TELEPHONE ENCOUNTER
Care Due:                  Date            Visit Type   Department     Provider  --------------------------------------------------------------------------------    Last Visit: None Found      None         None Found                              EP -                              PRIMARY      [facility] INTERNAL  Next Visit: 10-      CARE ([facility])   MEDICINE       Shaina Walters                                                            Last  Test          Frequency    Reason                     Performed    Due Date  --------------------------------------------------------------------------------    CMP.........  12 months..  cholestyramine-aspartame,   Not Found    Overdue                             famotidine...............    Lipid Panel.  12 months..  cholestyramine-aspartame.  Not Found    Overdue    [employer] Embedded Care Due Messages. Reference number: [id number].   9/21/2023 3:37:43 AM CDT

## 2023-09-22 RX ORDER — CHOLESTYRAMINE LIGHT 4 G/5.7G
POWDER, FOR SUSPENSION ORAL
OUTPATIENT
Start: 2023-09-22

## 2023-09-22 NOTE — TELEPHONE ENCOUNTER
Received refill request for Questran. I cannot refill the medication but he may request refill from the provider that he saw on 9/14/23.     He had GI issues back in 2018 and never returned to GI for continued workup. Please arrange GI appointment as soon as possible.

## 2023-11-02 ENCOUNTER — LAB VISIT (OUTPATIENT)
Dept: LAB | Facility: HOSPITAL | Age: 38
End: 2023-11-02
Attending: INTERNAL MEDICINE
Payer: COMMERCIAL

## 2023-11-02 DIAGNOSIS — Z00.00 ANNUAL PHYSICAL EXAM: ICD-10-CM

## 2023-11-02 LAB
ALBUMIN SERPL BCP-MCNC: 3.9 G/DL (ref 3.5–5.2)
ALP SERPL-CCNC: 73 U/L (ref 55–135)
ALT SERPL W/O P-5'-P-CCNC: 26 U/L (ref 10–44)
ANION GAP SERPL CALC-SCNC: 11 MMOL/L (ref 8–16)
AST SERPL-CCNC: 20 U/L (ref 10–40)
BASOPHILS # BLD AUTO: 0.06 K/UL (ref 0–0.2)
BASOPHILS NFR BLD: 0.7 % (ref 0–1.9)
BILIRUB SERPL-MCNC: 0.3 MG/DL (ref 0.1–1)
BUN SERPL-MCNC: 16 MG/DL (ref 6–20)
CALCIUM SERPL-MCNC: 9.8 MG/DL (ref 8.7–10.5)
CHLORIDE SERPL-SCNC: 102 MMOL/L (ref 95–110)
CHOLEST SERPL-MCNC: 181 MG/DL (ref 120–199)
CHOLEST/HDLC SERPL: 5.2 {RATIO} (ref 2–5)
CO2 SERPL-SCNC: 25 MMOL/L (ref 23–29)
CREAT SERPL-MCNC: 0.9 MG/DL (ref 0.5–1.4)
DIFFERENTIAL METHOD: ABNORMAL
EOSINOPHIL # BLD AUTO: 0.3 K/UL (ref 0–0.5)
EOSINOPHIL NFR BLD: 4 % (ref 0–8)
ERYTHROCYTE [DISTWIDTH] IN BLOOD BY AUTOMATED COUNT: 13.1 % (ref 11.5–14.5)
EST. GFR  (NO RACE VARIABLE): >60 ML/MIN/1.73 M^2
GLUCOSE SERPL-MCNC: 102 MG/DL (ref 70–110)
HCT VFR BLD AUTO: 38.9 % (ref 40–54)
HDLC SERPL-MCNC: 35 MG/DL (ref 40–75)
HDLC SERPL: 19.3 % (ref 20–50)
HGB BLD-MCNC: 12.2 G/DL (ref 14–18)
IMM GRANULOCYTES # BLD AUTO: 0.03 K/UL (ref 0–0.04)
IMM GRANULOCYTES NFR BLD AUTO: 0.4 % (ref 0–0.5)
LDLC SERPL CALC-MCNC: 119.2 MG/DL (ref 63–159)
LYMPHOCYTES # BLD AUTO: 2.2 K/UL (ref 1–4.8)
LYMPHOCYTES NFR BLD: 26.1 % (ref 18–48)
MCH RBC QN AUTO: 25.8 PG (ref 27–31)
MCHC RBC AUTO-ENTMCNC: 31.4 G/DL (ref 32–36)
MCV RBC AUTO: 82 FL (ref 82–98)
MONOCYTES # BLD AUTO: 0.8 K/UL (ref 0.3–1)
MONOCYTES NFR BLD: 10 % (ref 4–15)
NEUTROPHILS # BLD AUTO: 4.9 K/UL (ref 1.8–7.7)
NEUTROPHILS NFR BLD: 58.8 % (ref 38–73)
NONHDLC SERPL-MCNC: 146 MG/DL
NRBC BLD-RTO: 0 /100 WBC
PLATELET # BLD AUTO: 350 K/UL (ref 150–450)
PMV BLD AUTO: 9.8 FL (ref 9.2–12.9)
POTASSIUM SERPL-SCNC: 4.1 MMOL/L (ref 3.5–5.1)
PROT SERPL-MCNC: 7.8 G/DL (ref 6–8.4)
RBC # BLD AUTO: 4.72 M/UL (ref 4.6–6.2)
SODIUM SERPL-SCNC: 138 MMOL/L (ref 136–145)
TRIGL SERPL-MCNC: 134 MG/DL (ref 30–150)
TSH SERPL DL<=0.005 MIU/L-ACNC: 1.54 UIU/ML (ref 0.4–4)
WBC # BLD AUTO: 8.32 K/UL (ref 3.9–12.7)

## 2023-11-02 PROCEDURE — 80061 LIPID PANEL: CPT | Performed by: INTERNAL MEDICINE

## 2023-11-02 PROCEDURE — 36415 COLL VENOUS BLD VENIPUNCTURE: CPT | Mod: PO | Performed by: INTERNAL MEDICINE

## 2023-11-02 PROCEDURE — 85025 COMPLETE CBC W/AUTO DIFF WBC: CPT | Performed by: INTERNAL MEDICINE

## 2023-11-02 PROCEDURE — 80053 COMPREHEN METABOLIC PANEL: CPT | Performed by: INTERNAL MEDICINE

## 2023-11-02 PROCEDURE — 84443 ASSAY THYROID STIM HORMONE: CPT | Performed by: INTERNAL MEDICINE

## 2023-11-06 ENCOUNTER — OFFICE VISIT (OUTPATIENT)
Dept: GASTROENTEROLOGY | Facility: CLINIC | Age: 38
End: 2023-11-06
Payer: COMMERCIAL

## 2023-11-06 VITALS — BODY MASS INDEX: 40.36 KG/M2 | WEIGHT: 265.44 LBS

## 2023-11-06 DIAGNOSIS — Z86.010 HISTORY OF COLON POLYPS: Primary | ICD-10-CM

## 2023-11-06 DIAGNOSIS — K21.9 GASTROESOPHAGEAL REFLUX DISEASE, UNSPECIFIED WHETHER ESOPHAGITIS PRESENT: ICD-10-CM

## 2023-11-06 PROCEDURE — 99999 PR PBB SHADOW E&M-EST. PATIENT-LVL III: CPT | Mod: PBBFAC,,,

## 2023-11-06 PROCEDURE — 99204 PR OFFICE/OUTPT VISIT, NEW, LEVL IV, 45-59 MIN: ICD-10-PCS | Mod: S$GLB,,,

## 2023-11-06 PROCEDURE — 99204 OFFICE O/P NEW MOD 45 MIN: CPT | Mod: S$GLB,,,

## 2023-11-06 PROCEDURE — 1159F PR MEDICATION LIST DOCUMENTED IN MEDICAL RECORD: ICD-10-PCS | Mod: CPTII,S$GLB,,

## 2023-11-06 PROCEDURE — 3008F PR BODY MASS INDEX (BMI) DOCUMENTED: ICD-10-PCS | Mod: CPTII,S$GLB,,

## 2023-11-06 PROCEDURE — 1159F MED LIST DOCD IN RCRD: CPT | Mod: CPTII,S$GLB,,

## 2023-11-06 PROCEDURE — 3008F BODY MASS INDEX DOCD: CPT | Mod: CPTII,S$GLB,,

## 2023-11-06 PROCEDURE — 99999 PR PBB SHADOW E&M-EST. PATIENT-LVL III: ICD-10-PCS | Mod: PBBFAC,,,

## 2023-11-06 NOTE — PROGRESS NOTES
GASTROENTEROLOGY CLINIC NOTE    Reason for visit: The primary encounter diagnosis was History of colon polyps. A diagnosis of Gastroesophageal reflux disease, unspecified whether esophagitis present was also pertinent to this visit.  Referring provider/PCP: Shaina Walters MD    HPI:  Luca Story is a 37 y.o. male here today for GERD. He reports having symptoms for atleast 15 years. Intermittent pyrosis during the day with significant acid regurgitation at night. Was taking 4 OTC omeprazole daily. Recently saw PCP who told him to take 1 omeprazole daily and pepcid at night. He reports symptoms have improved since starting this medication regimen. EGD in 2018- HH noted. Colonoscopy- polyp removed, due for surveillance.     Prior Endoscopy: 2018 with Dr. Jackson:  EGD:  - Z-line irregular, 37 cm from the incisors, biopsied to evaluate for Calvillo's esophagus.              - Small hiatal hernia. Stomach biopsied to r/o H.pylori.              - Normal examined duodenum, biopsied.    Colon:- Diverticulosis in the sigmoid colon.              - One sigmoid colon polyp was resected and retrieved.             - Repeat colonoscopy in 5 years for surveillance    (Portions of this note were dictated using voice recognition software and may contain dictation related errors in spelling/grammar/syntax not found on text review)    Review of Systems   Gastrointestinal:  Positive for heartburn (currently controlled). Negative for abdominal pain.       Past Medical History: has a past medical history of Anemia, Diverticulitis, Diverticulitis, and GERD (gastroesophageal reflux disease).    Past Surgical History: has a past surgical history that includes Knee surgery (Left); Esophagogastroduodenoscopy (N/A, 8/1/2018); Colonoscopy (N/A, 8/1/2018); and Knee arthroscopy (Right).    Home medications:   Current Outpatient Medications on File Prior to Visit   Medication Sig Dispense Refill    buprenorphine-naloxone 8-2 mg  (SUBOXONE) 8-2 mg Place under the tongue.      famotidine (PEPCID) 40 MG tablet Take 1 tablet (40 mg total) by mouth once daily. 90 tablet 3    gabapentin (NEURONTIN) 100 MG capsule Take 100 mg by mouth 3 (three) times daily.      cholestyramine-aspartame (CHOLESTYRAMINE LIGHT) 4 gram PwPk Take 1 packet (4 g total) by mouth 2 (two) times daily as needed (diarrhea). 20 packet 0    diazePAM (VALIUM) 10 MG Tab Take 1 tablet (10 mg total) by mouth On call Procedure. 1 tablet 0    diclofenac (VOLTAREN) 75 MG EC tablet Take 1 tablet (75 mg total) by mouth 2 (two) times daily as needed. (Patient not taking: Reported on 11/6/2023) 40 tablet 0    flu vac [id number] 36mos up,PF, 60 mcg (15 mcg x 4)/0.5 mL Syrg inject into the muscle (Patient not taking: Reported on 11/6/2023) 0.5 mL 0    omeprazole (PRILOSEC OTC) 20 MG tablet Take 1 tablet (20 mg total) by mouth once daily. 30 tablet 0     No current facility-administered medications on file prior to visit.       Vital signs:  Wt 120.4 kg (265 lb 6.9 oz)   BMI 40.36 kg/m²     Physical Exam  Constitutional:       Appearance: Normal appearance. He is obese.   Abdominal:      General: Abdomen is flat. There is no distension.   Neurological:      Mental Status: He is alert.       I have reviewed associated labs, imaging and notes.     Assessment:  1. History of colon polyps    2. Gastroesophageal reflux disease, unspecified whether esophagitis present    Hx of TA in 2018   5 year recall   No known FH  GERD, longstanding   HH noted in 2018   Previously uncontrolled, now taking omeprazole and pepcid daily   Minimal breakthrough symptoms     Plan:  Orders Placed This Encounter    Case Request Endoscopy: COLONOSCOPY, EGD (ESOPHAGOGASTRODUODENOSCOPY)     Schedule EGD/colonoscopy    Miralax prep d/t insurance coverage  Continue omeprazole and pepcid as previously prescribed      Bessy Ma NP  [facility]

## 2023-11-06 NOTE — PATIENT INSTRUCTIONS
Miralax Prep    [facility] [address]  Clinic Office [phone]  Endoscopy Lab [phone]    You are scheduled for a Colonoscopy with Dr. Rose on 12/19/23 at [facility] in [place].    Check in at the Hospital -1st floor, Information desk.   Call [phone]  to reschedule.    An adult friend/family member must come with you to drive you home.  You cannot drive, take a taxi, Uber/Lyft or bus to leave the Endoscopy Center alone.  If you do not have someone to drive you home, your test will be cancelled.     Please follow the directions of your doctor if you take any pills that thin your blood. If you take these meds: Aggrenox, Brilinta, Effient, Eliquis, Lovenox, Plavix, Pletal, Pradaxa, Ticilid, Xarelto or Coumadin, let the doctor's office know.    Please hold any GLP-1 medications prior to the procedure: Dulaglutide Trulicity(hold week prior), Exenatide Byetta (hold the morning of procedure), Semaglutide Ozempic (hold week prior), Liraglutide Victoza, Saxenda(hold week prior), Lixisenatide Adlyxin (hold the morning of procedure), Semaglutide Rybelsus (hold the morning of procedure), Tirzepatide Mounjaro (hold week prior)     DON'T: On the morning of the test do not take insulin or pills for diabetes.     DO: On the morning of the test, do take any pills for blood pressure, heart, anti-rejection and or seizures with a small sip of water. Bring any inhalers with you.    To have a good prep, you must follow these instructions - please do not use the directions from the pharmacy.    You need to buy medicine from the drugstore. You do not need a prescription from the doctor. Generic medicine is ok.  4 Dulcolax pills - 5mg  Gas X (simethicone) 125mg capsules or tablets   1 Bottle of Miralax - 238gram bottle  128 ounces of Gatorade (yellow or green)      The Day Before the test:    You can only drink CLEAR LIQUIDS the whole day before your test.  You can't eat any  food for the whole day.    You CAN have:  Water, Coffee or decaf coffee (no milk or cream)  Tea  Soft drinks - regular and sugar free  Jello (green or yellow)  Apple Juice, grape juice, white cranberry juice  Gatorade, Power Aid, Crystal Light, Ronald Aid  Lemonade and Limeade  Bouillon, clear soup  Snowball, popsicles  YOU CAN'T DRINK ANYTHING RED, Purple Orange or BLUE  YOU CAN'T DRINK ALCOHOL  ONLY DRINK WHAT IS ON THE LIST    At 4:00 PM  Take 4 pills of Dulcolax.  Take 1 dose of simethicone (Gas-X) tablets or capsules. Use as Directed.       At 6:00 PM,   Drink 1 glass (8 ounces) every 10 minutes until 64 ounces of Gatorade is finished. (Keep it cold and in refrigerator as much as you can while drinking it).   Add 1 capful of Miralax to each 8 ounces of Gatorade = 4 capfuls in 32-ounce bottle = 8 capfuls in 64-ounce bottle.    Make it in the morning. Put it in the refrigerator AFTER you make it. It tastes better if it is cold. Do NOT put this solution over ice. It is ok to drink with a straw.    The Day of the test - We will call you 2 days before your test to tell you what time to get there.    5 hours before you come to the hospital (this may be in the middle of the night)  Drink 1 glass (8 ounces) every 10 minutes until 64 ounces of Gatorade is finished. (Keep it cold and in refrigerator as much as you can while drinking it).   Add 1 capful of Miralax to each 8 ounces of Gatorade = 4 capfuls in 32-ounce bottle = 8 capfuls in 64-ounce bottle.    Make it in the morning. Put it in the refrigerator AFTER you make it. It tastes better if it is cold. Do NOT put this solution over ice. It is ok to drink with a straw    YOU CAN'T EAT OR DRINK ANYTHING ELSE ONCE YOU FINISH THE PREP    Leave all valuables and jewelry at home. You will be at the hospital for 2-4 hours.    Call the Endoscopy department at [phone] with any questions about your procedure.

## 2023-11-08 ENCOUNTER — LAB VISIT (OUTPATIENT)
Dept: LAB | Facility: HOSPITAL | Age: 38
End: 2023-11-08
Attending: INTERNAL MEDICINE
Payer: COMMERCIAL

## 2023-11-08 ENCOUNTER — OFFICE VISIT (OUTPATIENT)
Dept: INTERNAL MEDICINE | Facility: CLINIC | Age: 38
End: 2023-11-08
Payer: COMMERCIAL

## 2023-11-08 VITALS
HEART RATE: 72 BPM | BODY MASS INDEX: 40.43 KG/M2 | RESPIRATION RATE: 16 BRPM | SYSTOLIC BLOOD PRESSURE: 128 MMHG | WEIGHT: 266.75 LBS | TEMPERATURE: 99 F | HEIGHT: 68 IN | DIASTOLIC BLOOD PRESSURE: 80 MMHG | OXYGEN SATURATION: 98 %

## 2023-11-08 DIAGNOSIS — Z13.1 SCREENING FOR DIABETES MELLITUS: ICD-10-CM

## 2023-11-08 DIAGNOSIS — F11.21 OPIOID DEPENDENCE IN REMISSION: ICD-10-CM

## 2023-11-08 DIAGNOSIS — Z00.00 ANNUAL PHYSICAL EXAM: Primary | ICD-10-CM

## 2023-11-08 DIAGNOSIS — K21.9 GASTROESOPHAGEAL REFLUX DISEASE, UNSPECIFIED WHETHER ESOPHAGITIS PRESENT: ICD-10-CM

## 2023-11-08 DIAGNOSIS — R16.2 HEPATOSPLENOMEGALY: ICD-10-CM

## 2023-11-08 DIAGNOSIS — E66.01 MORBID OBESITY WITH BMI OF 40.0-44.9, ADULT: ICD-10-CM

## 2023-11-08 DIAGNOSIS — D64.9 ANEMIA, UNSPECIFIED TYPE: ICD-10-CM

## 2023-11-08 DIAGNOSIS — D22.9 CHANGE IN MOLE: ICD-10-CM

## 2023-11-08 PROBLEM — M79.672 BILATERAL FOOT PAIN: Status: RESOLVED | Noted: 2018-08-06 | Resolved: 2023-11-08

## 2023-11-08 PROBLEM — M79.671 BILATERAL FOOT PAIN: Status: RESOLVED | Noted: 2018-08-06 | Resolved: 2023-11-08

## 2023-11-08 PROBLEM — M79.89 SWELLING OF EXTREMITY: Status: RESOLVED | Noted: 2018-07-27 | Resolved: 2023-11-08

## 2023-11-08 PROBLEM — D75.839 THROMBOCYTOSIS: Status: RESOLVED | Noted: 2018-08-26 | Resolved: 2023-11-08

## 2023-11-08 PROBLEM — D72.821 MONOCYTOSIS: Status: RESOLVED | Noted: 2018-08-26 | Resolved: 2023-11-08

## 2023-11-08 PROBLEM — D72.829 LEUKOCYTOSIS: Status: RESOLVED | Noted: 2018-07-16 | Resolved: 2023-11-08

## 2023-11-08 PROBLEM — M79.89 SWELLING OF BOTH HANDS: Status: RESOLVED | Noted: 2018-08-06 | Resolved: 2023-11-08

## 2023-11-08 LAB
ESTIMATED AVG GLUCOSE: 126 MG/DL (ref 68–131)
HBA1C MFR BLD: 6 % (ref 4–5.6)

## 2023-11-08 PROCEDURE — 1160F PR REVIEW ALL MEDS BY PRESCRIBER/CLIN PHARMACIST DOCUMENTED: ICD-10-PCS | Mod: CPTII,S$GLB,, | Performed by: INTERNAL MEDICINE

## 2023-11-08 PROCEDURE — 83540 ASSAY OF IRON: CPT | Performed by: INTERNAL MEDICINE

## 2023-11-08 PROCEDURE — 1159F PR MEDICATION LIST DOCUMENTED IN MEDICAL RECORD: ICD-10-PCS | Mod: CPTII,S$GLB,, | Performed by: INTERNAL MEDICINE

## 2023-11-08 PROCEDURE — 84466 ASSAY OF TRANSFERRIN: CPT | Performed by: INTERNAL MEDICINE

## 2023-11-08 PROCEDURE — 99385 PREV VISIT NEW AGE 18-39: CPT | Mod: S$GLB,,, | Performed by: INTERNAL MEDICINE

## 2023-11-08 PROCEDURE — 3074F PR MOST RECENT SYSTOLIC BLOOD PRESSURE < 130 MM HG: ICD-10-PCS | Mod: CPTII,S$GLB,, | Performed by: INTERNAL MEDICINE

## 2023-11-08 PROCEDURE — 3079F DIAST BP 80-89 MM HG: CPT | Mod: CPTII,S$GLB,, | Performed by: INTERNAL MEDICINE

## 2023-11-08 PROCEDURE — 99999 PR PBB SHADOW E&M-EST. PATIENT-LVL V: CPT | Mod: PBBFAC,,, | Performed by: INTERNAL MEDICINE

## 2023-11-08 PROCEDURE — 3008F BODY MASS INDEX DOCD: CPT | Mod: CPTII,S$GLB,, | Performed by: INTERNAL MEDICINE

## 2023-11-08 PROCEDURE — 36415 COLL VENOUS BLD VENIPUNCTURE: CPT | Mod: PO | Performed by: INTERNAL MEDICINE

## 2023-11-08 PROCEDURE — 82728 ASSAY OF FERRITIN: CPT | Performed by: INTERNAL MEDICINE

## 2023-11-08 PROCEDURE — 99385 PR PREVENTIVE VISIT,NEW,18-39: ICD-10-PCS | Mod: S$GLB,,, | Performed by: INTERNAL MEDICINE

## 2023-11-08 PROCEDURE — 83036 HEMOGLOBIN GLYCOSYLATED A1C: CPT | Performed by: INTERNAL MEDICINE

## 2023-11-08 PROCEDURE — 3079F PR MOST RECENT DIASTOLIC BLOOD PRESSURE 80-89 MM HG: ICD-10-PCS | Mod: CPTII,S$GLB,, | Performed by: INTERNAL MEDICINE

## 2023-11-08 PROCEDURE — 3008F PR BODY MASS INDEX (BMI) DOCUMENTED: ICD-10-PCS | Mod: CPTII,S$GLB,, | Performed by: INTERNAL MEDICINE

## 2023-11-08 PROCEDURE — 1160F RVW MEDS BY RX/DR IN RCRD: CPT | Mod: CPTII,S$GLB,, | Performed by: INTERNAL MEDICINE

## 2023-11-08 PROCEDURE — 99999 PR PBB SHADOW E&M-EST. PATIENT-LVL V: ICD-10-PCS | Mod: PBBFAC,,, | Performed by: INTERNAL MEDICINE

## 2023-11-08 PROCEDURE — 3074F SYST BP LT 130 MM HG: CPT | Mod: CPTII,S$GLB,, | Performed by: INTERNAL MEDICINE

## 2023-11-08 PROCEDURE — 1159F MED LIST DOCD IN RCRD: CPT | Mod: CPTII,S$GLB,, | Performed by: INTERNAL MEDICINE

## 2023-11-08 NOTE — PROGRESS NOTES
Subjective:     Luca Story is a 37 y.o. male who presents for an annual exam.    PCP: Shaina Walters MD    Medical History:   Past Medical History:   Diagnosis Date    Anemia     Diverticulitis     Diverticulitis     GERD (gastroesophageal reflux disease)      Family History: family history includes Cancer in his maternal grandmother; Cirrhosis in his maternal uncle; Diabetes in his maternal grandmother; Rheum arthritis in his mother.    Surgical History:   Past Surgical History:   Procedure Laterality Date    COLONOSCOPY N/A 8/1/2018    Procedure: COLONOSCOPY;  Surgeon: Alex Jackson MD;  Location: [facility] ([address] [place] [address]);  Service: Endoscopy;  Laterality: N/A;    ESOPHAGOGASTRODUODENOSCOPY N/A 8/1/2018    Procedure: EGD (ESOPHAGOGASTRODUODENOSCOPY);  Surgeon: Alex Jackson MD;  Location: [facility] ([address] [place] [address]);  Service: Endoscopy;  Laterality: N/A;  Case said first available; Pt okay with f/a; Dr. Garcia's next available is 8/25, pt wanting sooner    KNEE ARTHROSCOPY Right     KNEE SURGERY Left     ACL      Social History:  reports that he has never smoked. He has been exposed to tobacco smoke. He has never used smokeless tobacco. He reports current drug use. He reports that he does not drink alcohol.     Allergies: Review of patient's allergies indicates:  No Known Allergies    Medications:   Current Outpatient Medications:     buprenorphine-naloxone 8-2 mg (SUBOXONE) 8-2 mg, Place under the tongue., Disp: , Rfl:     diclofenac (VOLTAREN) 75 MG EC tablet, Take 1 tablet (75 mg total) by mouth 2 (two) times daily as needed., Disp: 40 tablet, Rfl: 0    famotidine (PEPCID) 40 MG tablet, Take 1 tablet (40 mg total) by mouth once daily., Disp: 90 tablet, Rfl: 3    omeprazole (PRILOSEC OTC) 20 MG tablet, Take 1 tablet (20 mg total) by mouth once daily., Disp: 30 tablet, Rfl: 0    Health Maintenance:   Health Maintenance Topics with due status: Not Due       Topic Last Completion Date    Lipid Panel  2023     Lab Results   Component Value Date    HEPCAB Negative 2018     Eye Exam: due  Dental Exam: every 6 months  Colonoscopy: Last Colonoscopy completed on 2018, repeat now  Hepatitis C screenin2018, neg    Vaccinations:   Immunization History   Administered Date(s) Administered    Influenza - Quadrivalent - PF *Preferred* (6 months and older) 10/01/2018     Influenza: due  Tetanus: due  Covid vaccine: done, patient will submit dates via email    Suboxone management: with [facility] on [address] but initially with [facility]    Body mass index is 40.56 kg/m².  Wt Readings from Last 3 Encounters:   23 121 kg (266 lb 12.1 oz)   23 120.4 kg (265 lb 6.9 oz)   23 122.1 kg (269 lb 2.9 oz)   - eats a healthy diet, works a lot    Consider off-label meds for     Review of Systems   Constitutional:  Negative for chills, diaphoresis, fatigue and fever.   HENT:  Negative for congestion, dental problem, ear discharge, ear pain, postnasal drip, rhinorrhea, sinus pressure, sore throat and trouble swallowing.    Eyes:  Negative for redness and visual disturbance.   Respiratory:  Negative for cough, chest tightness and shortness of breath.    Cardiovascular:  Negative for chest pain, palpitations and leg swelling.   Gastrointestinal:  Negative for abdominal pain, blood in stool, constipation, diarrhea, nausea and vomiting.   Endocrine: Negative for cold intolerance and heat intolerance.   Genitourinary:  Negative for decreased urine volume, dysuria, frequency and hematuria.   Musculoskeletal:  Negative for arthralgias, back pain and myalgias.   Skin:  Positive for wound (mole on lower neck that is persistent and never completely heals). Negative for rash.   Neurological:  Negative for dizziness, weakness, numbness and headaches.   Hematological:  Negative for adenopathy.   Psychiatric/Behavioral:  Negative for dysphoric mood and sleep disturbance. The patient is not nervous/anxious.            Objective:     Physical Exam  Vitals reviewed.   Constitutional:       General: He is awake. He is not in acute distress.     Appearance: Normal appearance. He is well-developed and well-groomed. He is not ill-appearing.   HENT:      Head: Normocephalic and atraumatic.      Right Ear: Hearing, tympanic membrane, ear canal and external ear normal. Tympanic membrane is not erythematous or bulging.      Left Ear: Hearing, tympanic membrane, ear canal and external ear normal. Tympanic membrane is not erythematous or bulging.      Nose: Nose normal. No congestion.      Mouth/Throat:      Mouth: Mucous membranes are moist.      Tongue: No lesions.      Pharynx: Oropharynx is clear. Uvula midline. No oropharyngeal exudate or posterior oropharyngeal erythema.   Eyes:      General: Lids are normal. Vision grossly intact. Gaze aligned appropriately. No scleral icterus.     Conjunctiva/sclera: Conjunctivae normal.      Right eye: Right conjunctiva is not injected.      Left eye: Left conjunctiva is not injected.      Pupils: Pupils are equal, round, and reactive to light.   Neck:      Thyroid: No thyroid mass or thyromegaly.   Cardiovascular:      Rate and Rhythm: Normal rate and regular rhythm.      Pulses: Normal pulses.      Heart sounds: Normal heart sounds. No murmur heard.  Pulmonary:      Effort: Pulmonary effort is normal. No respiratory distress.      Breath sounds: Normal breath sounds. No decreased breath sounds or wheezing.   Abdominal:      General: Bowel sounds are normal. There is no distension.      Palpations: Abdomen is soft.      Tenderness: There is no abdominal tenderness. There is no guarding or rebound.   Musculoskeletal:         General: Normal range of motion.      Cervical back: Normal range of motion and neck supple.      Right lower leg: No edema.      Left lower leg: No edema.   Lymphadenopathy:      Cervical: No cervical adenopathy.      Upper Body:      Right upper body: No supraclavicular  adenopathy.      Left upper body: No supraclavicular adenopathy.   Skin:     General: Skin is warm and dry.      Coloration: Skin is not cyanotic.      Findings: Lesion (lesion on neck near sternum, irregular texture, irregular pigmentation and irregular border) present. No rash.      Nails: There is no clubbing.   Neurological:      General: No focal deficit present.      Mental Status: He is alert and oriented to person, place, and time.      Coordination: Coordination is intact.      Gait: Gait is intact.      Deep Tendon Reflexes: Reflexes are normal and symmetric. Reflexes normal.   Psychiatric:         Attention and Perception: Attention normal.         Mood and Affect: Mood normal.         Behavior: Behavior is cooperative.            Assessment:        1. Annual physical exam    2. Gastroesophageal reflux disease, unspecified whether esophagitis present    3. Hepatosplenomegaly    4. Anemia, unspecified type    5. Opioid dependence in remission    6. Change in mole    7. Screening for diabetes mellitus    8. Morbid obesity with BMI of 40.0-44.9, adult           Plan:     1. Annual physical exam  - Ambulatory referral/consult to Optometry; Future  - reviewed recent labs with patient    2. Gastroesophageal reflux disease, unspecified whether esophagitis present  - recently evaluated by GI, planning EGD/colonoscopy  - continue Pepcid with PRN Protonix    3. Hepatosplenomegaly  - Ambulatory referral/consult to Hepatology; Future  - will return for continued evaluation    4. Anemia, unspecified type  - Iron and TIBC; Future  - Ferritin; Future    5. Opioid dependence in remission  - currently takes Suboxone  - he is considering transitioning to Vivitrol with symptoms management during transition, he will discuss with addiction specialist    6. Change in mole  - Ambulatory referral/consult to Dermatology; Future    7. Screening for diabetes mellitus  - Hemoglobin A1C; Future    8. Morbid obesity with BMI of  40.0-44.9, adult  - reduce intake of unhealthy foods, increase physical activity, will monitor    RTC in 6 months for follow-up or sooner if needed    __________________________    Shaina Walters MD, PharmD  [facility]  American Board of Obesity Medicine diplomate  Office [phone]

## 2023-11-09 LAB
FERRITIN SERPL-MCNC: 73 NG/ML (ref 20–300)
IRON SERPL-MCNC: 54 UG/DL (ref 45–160)
SATURATED IRON: 14 % (ref 20–50)
TOTAL IRON BINDING CAPACITY: 379 UG/DL (ref 250–450)
TRANSFERRIN SERPL-MCNC: 256 MG/DL (ref 200–375)

## 2023-11-13 ENCOUNTER — OFFICE VISIT (OUTPATIENT)
Dept: DERMATOLOGY | Facility: CLINIC | Age: 38
End: 2023-11-13
Payer: COMMERCIAL

## 2023-11-13 DIAGNOSIS — D48.5 NEOPLASM OF UNCERTAIN BEHAVIOR OF SKIN: ICD-10-CM

## 2023-11-13 PROCEDURE — 11102 PR TANGENTIAL BIOPSY, SKIN, SINGLE LESION: ICD-10-PCS | Mod: S$GLB,,, | Performed by: STUDENT IN AN ORGANIZED HEALTH CARE EDUCATION/TRAINING PROGRAM

## 2023-11-13 PROCEDURE — 11102 TANGNTL BX SKIN SINGLE LES: CPT | Mod: S$GLB,,, | Performed by: STUDENT IN AN ORGANIZED HEALTH CARE EDUCATION/TRAINING PROGRAM

## 2023-11-13 PROCEDURE — 99499 UNLISTED E&M SERVICE: CPT | Mod: S$GLB,,, | Performed by: STUDENT IN AN ORGANIZED HEALTH CARE EDUCATION/TRAINING PROGRAM

## 2023-11-13 PROCEDURE — 88305 TISSUE EXAM BY PATHOLOGIST: ICD-10-PCS | Mod: 26,,, | Performed by: PATHOLOGY

## 2023-11-13 PROCEDURE — 88305 TISSUE EXAM BY PATHOLOGIST: CPT | Performed by: PATHOLOGY

## 2023-11-13 PROCEDURE — 99499 NO LOS: ICD-10-PCS | Mod: S$GLB,,, | Performed by: STUDENT IN AN ORGANIZED HEALTH CARE EDUCATION/TRAINING PROGRAM

## 2023-11-13 PROCEDURE — 99999 PR PBB SHADOW E&M-EST. PATIENT-LVL III: ICD-10-PCS | Mod: PBBFAC,,, | Performed by: STUDENT IN AN ORGANIZED HEALTH CARE EDUCATION/TRAINING PROGRAM

## 2023-11-13 PROCEDURE — 88305 TISSUE EXAM BY PATHOLOGIST: CPT | Mod: 26,,, | Performed by: PATHOLOGY

## 2023-11-13 PROCEDURE — 99999 PR PBB SHADOW E&M-EST. PATIENT-LVL III: CPT | Mod: PBBFAC,,, | Performed by: STUDENT IN AN ORGANIZED HEALTH CARE EDUCATION/TRAINING PROGRAM

## 2023-11-13 NOTE — PROGRESS NOTES
Subjective:      Patient ID:  Luca Story is a 37 y.o. male who presents for   Chief Complaint   Patient presents with    Lesion     neck     Luca Story is a 37 y.o. male who presents for: evaluation of skin lesions.    New patient    The patient has the following lesions of concern:  Location: neck  Duration: 3 yrs  Symptoms: bleeds when picked at  Relieving factors/Previous treatments: none    Pertinent history:  History of blistering sunburns: No  History of tanning bed use: No  Family history of melanoma: Grandfather- unsure which type  Personal history of mole removal: No  Personal history of skin cancer: No         Review of Systems   Skin:  Negative for daily sunscreen use and wears hat.   Hematologic/Lymphatic: Does not bruise/bleed easily.       Objective:   Physical Exam   Constitutional: He appears well-developed and well-nourished. No distress.   Neurological: He is alert and oriented to person, place, and time. He is not disoriented.   Psychiatric: He has a normal mood and affect.   Skin:   Areas Examined (abnormalities noted in diagram):   Head / Face Inspection Performed  Neck Inspection Performed            Diagram Legend     Erythematous scaling macule/papule c/w actinic keratosis       Vascular papule c/w angioma      Pigmented verrucoid papule/plaque c/w seborrheic keratosis      Yellow umbilicated papule c/w sebaceous hyperplasia      Irregularly shaped tan macule c/w lentigo     1-2 mm smooth white papules consistent with Milia      Movable subcutaneous cyst with punctum c/w epidermal inclusion cyst      Subcutaneous movable cyst c/w pilar cyst      Firm pink to brown papule c/w dermatofibroma      Pedunculated fleshy papule(s) c/w skin tag(s)      Evenly pigmented macule c/w junctional nevus     Mildly variegated pigmented, slightly irregular-bordered macule c/w mildly atypical nevus      Flesh colored to evenly pigmented papule c/w intradermal nevus       Pink pearly  papule/plaque c/w basal cell carcinoma      Erythematous hyperkeratotic cursted plaque c/w SCC      Surgical scar with no sign of skin cancer recurrence      Open and closed comedones      Inflammatory papules and pustules      Verrucoid papule consistent consistent with wart     Erythematous eczematous patches and plaques     Dystrophic onycholytic nail with subungual debris c/w onychomycosis     Umbilicated papule    Erythematous-base heme-crusted tan verrucoid plaque consistent with inflamed seborrheic keratosis     Erythematous Silvery Scaling Plaque c/w Psoriasis     See annotation        Assessment / Plan:      Pathology Orders:       Normal Orders This Visit    Specimen to Pathology, Dermatology     Questions:    Procedure Type: Dermatology and skin neoplasms    Number of Specimens: 1    ------------------------: -------------------------    Spec 1 Procedure: Biopsy    Spec 1 Clinical Impression: 1.5 x 1 cm pink crusted plaque- r/o bcc    Spec 1 Source: mid clavicular neck    Release to patient: Immediate          Neoplasm of uncertain behavior of skin  -     Ambulatory referral/consult to Dermatology  -     Specimen to Pathology, Dermatology  Shave biopsy procedure note:    Shave biopsy performed after verbal consent including risk of infection, scar, recurrence, need for additional treatment of site. Area prepped with alcohol, anesthetized with approximately 1.0cc of 1% lidocaine with epinephrine. Lesional tissue shaved with razor blade. Hemostasis achieved with application of aluminum chloride followed by hyfrecation. No complications. Dressing applied. Wound care explained.    If + for NMSC will send to Mohs given location H&N    RTC pending bx results

## 2023-11-17 LAB
FINAL PATHOLOGIC DIAGNOSIS: NORMAL
Lab: NORMAL

## 2023-11-20 NOTE — PROGRESS NOTES
Recommended patient to Dr. Reddy for Mohs surgery consultation. Picture in chart.  1.5 cm nBCC mid clavicular neck  General Derm team: Sent patient for Mohs surgery. F/u with me in 6 months.

## 2023-12-15 ENCOUNTER — TELEPHONE (OUTPATIENT)
Dept: ENDOSCOPY | Facility: HOSPITAL | Age: 38
End: 2023-12-15
Payer: COMMERCIAL

## 2023-12-15 ENCOUNTER — TELEPHONE (OUTPATIENT)
Dept: GASTROENTEROLOGY | Facility: CLINIC | Age: 38
End: 2023-12-15
Payer: COMMERCIAL

## 2023-12-15 NOTE — TELEPHONE ENCOUNTER
Patient cancelled EGD/Colon exams on Tuesday, 12/19; will be out of town.  He will call to reschedule on a later date. Message sent to scheduling team.

## 2023-12-15 NOTE — TELEPHONE ENCOUNTER
----- Message from Krystle Zhao RN sent at 12/15/2023  9:34 AM CST -----  Regarding: EGD/Colon exams on Tuesday, 12/19  Patient cancelled EGD/Colon exams on Tuesday, 12/19; will be out of town.  He will call to reschedule on a later date.

## 2024-01-04 ENCOUNTER — PROCEDURE VISIT (OUTPATIENT)
Dept: DERMATOLOGY | Facility: CLINIC | Age: 39
End: 2024-01-04
Payer: COMMERCIAL

## 2024-01-04 VITALS
DIASTOLIC BLOOD PRESSURE: 93 MMHG | HEART RATE: 75 BPM | WEIGHT: 266 LBS | SYSTOLIC BLOOD PRESSURE: 134 MMHG | BODY MASS INDEX: 40.32 KG/M2 | HEIGHT: 68 IN

## 2024-01-04 DIAGNOSIS — C44.41 BASAL CELL CARCINOMA, SCALP/NECK: Primary | ICD-10-CM

## 2024-01-04 PROCEDURE — 17311 MOHS 1 STAGE H/N/HF/G: CPT | Mod: 51,S$GLB,, | Performed by: DERMATOLOGY

## 2024-01-04 PROCEDURE — 13132 CMPLX RPR F/C/C/M/N/AX/G/H/F: CPT | Mod: S$GLB,,, | Performed by: DERMATOLOGY

## 2024-01-04 PROCEDURE — 99499 UNLISTED E&M SERVICE: CPT | Mod: S$GLB,,, | Performed by: DERMATOLOGY

## 2024-01-04 NOTE — PROGRESS NOTES
PROCEDURE: Mohs' Micrographic Surgery    INDICATION: Biopsy-proven skin cancer of cosmetically and functionally important areas, including head, neck, genital, hand, foot, or areas known for having difficulty in healing, such as the lower anterior legs. Tumors with aggressive clinical behavior (rapidly growing, greater than 1 cm in diameter). In the very young patient, < 40 years of age.    REFERRING PROVIDER:  Clarice Yeboah MD    CASE NUMBER:     ANESTHETIC: 3 cc 0.5% Lidocaine with Epi 1:200,000 mixed 1:1 with 0.5% Bupivacaine    SURGICAL PREP: Hibiclens    SURGEON: Henry Reddy MD    ASSISTANTS: Gricelda Morrell MA    PREOPERATIVE DIAGNOSIS: basal cell carcinoma- superficial, nodular    POSTOPERATIVE DIAGNOSIS: basal cell carcinoma    PATHOLOGIC DIAGNOSIS: basal cell carcinoma- superficial, nodular    HISTOLOGY OF SPECIMENS IN FIRST STAGE:   Tumor Type:  No tumor seen.    STAGES OF MOHS' SURGERY PERFORMED: 1    TUMOR-FREE PLANE ACHIEVED: Yes    HEMOSTASIS: electrocoagulation     SPECIMENS: 3     LOCATION: mid clavicular neck (right).  Location verified with Dr. Yeboah's clinical photograph. Patient also verified location with hand held mirror.    INITIAL LESION SIZE: 1.0 x 1.2 cm    FINAL DEFECT SIZE: 1.7 x 2.4 cm    WOUND REPAIR/DISPOSITION: The patient tolerated Mohs' Micrographic Surgery for a basal cell carcinoma very well. When the tumor was completely removed, a repair of the surgical defect was undertaken.    PROCEDURE: Complex Linear Repair    INDICATION: Status post Mohs' Micrographic Surgery for basal cell carcinoma.    CASE NUMBER:     SURGEON: Henry Reddy MD    ASSISTANTS: Charlotte Carlos PA-C and Jennyfer Pisano Surg Deshaun    ANESTHETIC: 2 cc 1% Lidocaine with Epinephrine 1:100,000    SURGICAL PREP: Hibiclens, prepped by Jennyfer Pisano Surg Deshaun    LOCATION: mid clavicular neck (right)    DEFECT SIZE: 1.7 x 2.4 cm    WOUND REPAIR/DISPOSITION:  After the patient's carcinoma had been  "completely removed with Mohs' Micrographic Surgery, a repair of the surgical defect was undertaken. The patient was returned to the operating suite where the area of mid clavicular neck was prepped, draped, and anesthetized in the usual sterile fashion. The wound was widely undermined in all directions. The wound was undermined to a distance at least the maximum width of the defect as measured perpendicular to the closure line along at least one entire edge of the defect, in this case 2.5 cm. Then, electrocoagulation was used to obtain meticulous hemostasis. 4-0 Vicryl buried vertical mattress sutures were placed into the subcutaneous and dermal plane to close the wound and garth the cutaneous wound edge. Bilateral dog ears were identified and were removed by a standard Burow's triangle technique. The cutaneous wound edges were closed using running 5-0 fast absorbing gut suture.    The patient tolerated the procedure well.    The area was cleaned and dressed appropriately and the patient was given wound care instructions. Advised patient to contact our office with any questions or concerns regarding this area. Patient verbally stated understanding.    LENGTH OF REPAIR: 3.5 cm    Vitals:    01/04/24 1309 01/04/24 1452   BP: (!) 143/94 (!) 134/93   BP Location: Right arm Right arm   Patient Position: Sitting Sitting   BP Method: Large (Automatic) Large (Automatic)   Pulse: 88 75   Weight: 120.7 kg (266 lb)    Height: 5' 8" (1.727 m)          "

## 2024-03-10 NOTE — TELEPHONE ENCOUNTER
Pt requesting refill has not seen you since  9/6/2018  Scheduled annual for 10/25/2023    Had an appointment 9/14/2023 with provider [facility]   full range of motion in all extremities

## 2025-01-13 ENCOUNTER — PATIENT OUTREACH (OUTPATIENT)
Dept: ADMINISTRATIVE | Facility: HOSPITAL | Age: 40
End: 2025-01-13
Payer: COMMERCIAL

## 2025-04-09 ENCOUNTER — APPOINTMENT (OUTPATIENT)
Dept: LAB | Facility: HOSPITAL | Age: 40
End: 2025-04-09
Attending: INTERNAL MEDICINE
Payer: COMMERCIAL

## 2025-04-09 ENCOUNTER — OFFICE VISIT (OUTPATIENT)
Dept: INTERNAL MEDICINE | Facility: CLINIC | Age: 40
End: 2025-04-09
Payer: COMMERCIAL

## 2025-04-09 VITALS
WEIGHT: 265.63 LBS | OXYGEN SATURATION: 97 % | BODY MASS INDEX: 40.26 KG/M2 | SYSTOLIC BLOOD PRESSURE: 130 MMHG | RESPIRATION RATE: 18 BRPM | HEART RATE: 90 BPM | DIASTOLIC BLOOD PRESSURE: 92 MMHG | TEMPERATURE: 97 F | HEIGHT: 68 IN

## 2025-04-09 DIAGNOSIS — F11.21 OPIOID DEPENDENCE IN REMISSION: ICD-10-CM

## 2025-04-09 DIAGNOSIS — Z91.89 AT RISK FOR SLEEP APNEA: ICD-10-CM

## 2025-04-09 DIAGNOSIS — D64.9 ANEMIA, UNSPECIFIED TYPE: ICD-10-CM

## 2025-04-09 DIAGNOSIS — E61.1 IRON DEFICIENCY: ICD-10-CM

## 2025-04-09 DIAGNOSIS — E66.01 MORBID OBESITY WITH BMI OF 40.0-44.9, ADULT: ICD-10-CM

## 2025-04-09 DIAGNOSIS — Z98.890 H/O BASAL CELL CARCINOMA EXCISION: ICD-10-CM

## 2025-04-09 DIAGNOSIS — Z85.828 H/O BASAL CELL CARCINOMA EXCISION: ICD-10-CM

## 2025-04-09 DIAGNOSIS — R73.03 PREDIABETES: ICD-10-CM

## 2025-04-09 DIAGNOSIS — Z00.00 ANNUAL PHYSICAL EXAM: Primary | ICD-10-CM

## 2025-04-09 DIAGNOSIS — K21.9 GASTROESOPHAGEAL REFLUX DISEASE, UNSPECIFIED WHETHER ESOPHAGITIS PRESENT: ICD-10-CM

## 2025-04-09 DIAGNOSIS — R03.0 ELEVATED BLOOD PRESSURE READING: ICD-10-CM

## 2025-04-09 PROCEDURE — 1159F MED LIST DOCD IN RCRD: CPT | Mod: CPTII,S$GLB,, | Performed by: INTERNAL MEDICINE

## 2025-04-09 PROCEDURE — 3080F DIAST BP >= 90 MM HG: CPT | Mod: CPTII,S$GLB,, | Performed by: INTERNAL MEDICINE

## 2025-04-09 PROCEDURE — 99999 PR PBB SHADOW E&M-EST. PATIENT-LVL V: CPT | Mod: PBBFAC,,, | Performed by: INTERNAL MEDICINE

## 2025-04-09 PROCEDURE — 99395 PREV VISIT EST AGE 18-39: CPT | Mod: S$GLB,,, | Performed by: INTERNAL MEDICINE

## 2025-04-09 PROCEDURE — 3008F BODY MASS INDEX DOCD: CPT | Mod: CPTII,S$GLB,, | Performed by: INTERNAL MEDICINE

## 2025-04-09 PROCEDURE — 3075F SYST BP GE 130 - 139MM HG: CPT | Mod: CPTII,S$GLB,, | Performed by: INTERNAL MEDICINE

## 2025-04-09 PROCEDURE — 1160F RVW MEDS BY RX/DR IN RCRD: CPT | Mod: CPTII,S$GLB,, | Performed by: INTERNAL MEDICINE

## 2025-04-09 NOTE — PROGRESS NOTES
Subjective:     Luca Story is a 39 y.o. male who presents for an annual exam.    PCP: Shaina Walters MD    Luca presents today for annual exam and concerns about snoring    He reports worsening snoring over the past few years that is affecting his wife's sleep. He denies concerns about stopping breathing during sleep. A trial of mouthpiece was unsuccessful in reducing symptoms.    He reports elevated blood pressure readings over the past 5-6 visits to his Suboxone provider, with systolic pressures consistently above 120 mmHg and diastolic pressures remaining below 100 mmHg.    His weight is currently 265 lbs, down from 270 lbs, and has remained relatively stable for over a year. He reports eating one meal per day, with home-cooked meals Monday through Thursday typically consisting of grilled meat or fish with vegetables.    He has a history of inflammatory condition in 2018 characterized by severe generalized body swelling affecting hands and mobility, accompanied by pain. The condition involved significant eye swelling causing vision impairment and required multiple ED visits for IV hydration. He also has history of diverticulitis with no recent episodes, anemia with improvement noted in 2023, and GERD requiring daily medication management.      Medical History:   Past Medical History:   Diagnosis Date    Anemia     Basal cell carcinoma     Diverticulitis     Diverticulitis     GERD (gastroesophageal reflux disease)        Family History: family history includes Cancer in his maternal grandmother; Cirrhosis in his maternal uncle; Diabetes in his maternal grandmother; Melanoma in his maternal grandfather; Rheum arthritis in his mother.    Surgical History:   Past Surgical History:   Procedure Laterality Date    COLONOSCOPY N/A 8/1/2018    Procedure: COLONOSCOPY;  Surgeon: Alex Jackson MD;  Location: [address];  Service: Endoscopy;  Laterality: N/A;    ESOPHAGOGASTRODUODENOSCOPY N/A  2018    Procedure: EGD (ESOPHAGOGASTRODUODENOSCOPY);  Surgeon: Alex Jackson MD;  Location: [facility] ([address] [place] [address]);  Service: Endoscopy;  Laterality: N/A;  Case said first available; Pt okay with f/a; Dr. Garcia's next available is , pt wanting sooner    KNEE ARTHROSCOPY Right     KNEE SURGERY Left     ACL        Social History:  reports that he has never smoked. He has been exposed to tobacco smoke. He has never used smokeless tobacco. He reports current drug use. He reports that he does not drink alcohol.     Allergies: Review of patient's allergies indicates:  No Known Allergies    Medications: Current Medications[1]    Health Maintenance:   Health Maintenance Topics with due status: Not Due       Topic Last Completion Date    Lipid Panel 2023    Hemoglobin A1c (Diabetic Prevention Screening) 2023    RSV Vaccine (Age 60+ and Pregnant patients) Not Due     Lab Results   Component Value Date    HEPCAB Negative 2018     Eye Exam: due  Dental Exam:every 6 months  Colonoscopy: Last Colonoscopy completed on 2018  Hepatitis C screenin2018, neg    Vaccinations:   Immunization History   Administered Date(s) Administered    COVID-19, MRNA, LN-S, PF (MODERNA FULL 0.5 ML DOSE) 2021    Influenza - Quadrivalent - MDCK - PF 2023    Influenza - Quadrivalent - PF *Preferred* (6 months and older) 10/01/2018    Influenza - Trivalent - Flucelvax - PF 2024     Influenza: done  Tetanus: due  Covid vaccine: not boosted      Body mass index is 40.39 kg/m².  Wt Readings from Last 3 Encounters:   25 120.5 kg (265 lb 10.5 oz)   24 120.7 kg (266 lb)   23 121 kg (266 lb 12.1 oz)   - eats once daily, skips BF and L  - works 7am to 4pm  - he reports that his kids make it harder to stick for a healthy diet      Review of Systems   Constitutional:  Negative for activity change, chills, diaphoresis, fatigue, fever and unexpected weight change.   HENT:  Negative for  congestion, dental problem, ear discharge, ear pain, hearing loss, postnasal drip, rhinorrhea, sinus pressure, sore throat and trouble swallowing.    Eyes:  Negative for discharge, redness and visual disturbance.   Respiratory:  Positive for apnea. Negative for cough, chest tightness, shortness of breath and wheezing.    Cardiovascular:  Negative for chest pain, palpitations and leg swelling.   Gastrointestinal:  Positive for abdominal pain (reports heartburn that is controlled with combination of PPI and H2 blocker). Negative for blood in stool, constipation, diarrhea, nausea and vomiting.   Endocrine: Negative for polydipsia and polyuria.   Genitourinary:  Negative for decreased urine volume, difficulty urinating, dysuria, frequency, hematuria and urgency.   Musculoskeletal:  Positive for arthralgias and joint swelling. Negative for back pain, myalgias and neck pain.   Skin:  Negative for rash and wound.   Neurological:  Negative for dizziness, weakness, numbness and headaches.   Hematological:  Negative for adenopathy.   Psychiatric/Behavioral:  Negative for confusion, dysphoric mood and sleep disturbance. The patient is not nervous/anxious.           STOP-Bang Questionnaire         YES             NO          x  Snoring?  Do you snore loudly (loud enough to be heard through closed doors, or your bed partner elbows you for snoring at night)?          x  Tired?  Do you often feel tired, fatigued, or sleepy during the daytime (such as falling asleep during driving)?         x  Observed?  Has anyone observed you stop breathing or choking/gasping during your sleep?         x       Pressure?  Do you have or are being treated for high blood pressure?          x  Body mass index more than 35 kg/m2?             x Age older than 50 years old?           X  (17 in)  Neck size large? (measured around Donald's apple)  For male, is your shirt collar 17 inches or larger?  For female, is your shirt collar 16 inches or larger?          x  Gender = Male?             Objective:     Physical Exam  Vitals reviewed.   Constitutional:       General: He is awake. He is not in acute distress.     Appearance: Normal appearance. He is well-developed and well-groomed. He is not ill-appearing.   HENT:      Head: Normocephalic and atraumatic.      Right Ear: Hearing, tympanic membrane, ear canal and external ear normal. Tympanic membrane is not erythematous or bulging.      Left Ear: Hearing, tympanic membrane, ear canal and external ear normal. Tympanic membrane is not erythematous or bulging.      Nose: Nose normal. No congestion.      Mouth/Throat:      Mouth: Mucous membranes are moist.      Tongue: No lesions.      Pharynx: Oropharynx is clear. Uvula midline. No oropharyngeal exudate or posterior oropharyngeal erythema.   Eyes:      General: Lids are normal. Vision grossly intact. Gaze aligned appropriately. No scleral icterus.     Conjunctiva/sclera: Conjunctivae normal.      Right eye: Right conjunctiva is not injected.      Left eye: Left conjunctiva is not injected.      Pupils: Pupils are equal, round, and reactive to light.   Neck:      Thyroid: No thyroid mass or thyromegaly.   Cardiovascular:      Rate and Rhythm: Normal rate and regular rhythm.      Pulses: Normal pulses.      Heart sounds: Normal heart sounds. No murmur heard.  Pulmonary:      Effort: Pulmonary effort is normal. No respiratory distress.      Breath sounds: Normal breath sounds. No decreased breath sounds or wheezing.   Abdominal:      General: Bowel sounds are normal. There is no distension.      Palpations: Abdomen is soft.      Tenderness: There is no abdominal tenderness. There is no guarding or rebound.   Musculoskeletal:         General: Normal range of motion.      Cervical back: Normal range of motion and neck supple.      Right lower leg: No edema.      Left lower leg: No edema.   Lymphadenopathy:      Cervical: No cervical adenopathy.      Upper Body:      Right  upper body: No supraclavicular adenopathy.      Left upper body: No supraclavicular adenopathy.   Skin:     General: Skin is warm and dry.      Coloration: Skin is not cyanotic.      Findings: No lesion or rash.      Nails: There is no clubbing.   Neurological:      General: No focal deficit present.      Mental Status: He is alert and oriented to person, place, and time.      Coordination: Coordination is intact.      Gait: Gait is intact.      Deep Tendon Reflexes: Reflexes are normal and symmetric. Reflexes normal.   Psychiatric:         Attention and Perception: Attention normal.         Mood and Affect: Mood normal.         Behavior: Behavior is cooperative.            Assessment:        1. Annual physical exam    2. Elevated blood pressure reading    3. Prediabetes    4. Gastroesophageal reflux disease, unspecified whether esophagitis present    5. At risk for sleep apnea    6. Anemia, unspecified type    7. Iron deficiency    8. Morbid obesity with BMI of 40.0-44.9, adult    9. H/O basal cell carcinoma excision    10. Opioid dependence in remission           Plan:      - Evaluated snoring concerns and risk factors for sleep apnea including neck circumference.  - Assessed elevated BP readings, considering initiation of treatment after reviewing labs.  - Noted history of joint swelling and inflammatory markers, which have since resolved.  - Considered follow-up on previous anemia, noting improvement in hemoglobin levels.  - Evaluated ongoing GERD symptoms managed with current medication regimen.  - Assessed skin cancer risk.    OPIOID DEPENDENCE IN REMISSION:   Continue monthly Suboxone treatment for opioid dependence.   Evaluate the patient's blood pressure, which has been elevated in recent visits, with diastolic pressure under 100 mmHg but systolic pressure over 120 mmHg.   Assessed the patient's ongoing response to Suboxone treatment.    ESSENTIAL HYPERTENSION:   Discuss treatment options and explain the  importance of blood pressure management to the patient.   Discussed treatment options for high blood pressure.   Order fasting labs including HbA1c and cholesterol levels.    GASTROESOPHAGEAL REFLUX DISEASE (GERD):   Assess the patient's severe heartburn, which is currently well-controlled with daily medication (Omeprazole and Famotidine).   Continue the current treatment regimen and recommend adherence to GERD management strategies.    SNORING AND POTENTIAL SLEEP APNEA:   Evaluate the patient's worsening snoring over the past few years, which is affecting his wife's sleep.   Based on STOPBANG, score is high.   Refer the patient to a sleep specialist for evaluation of snoring and potential sleep apnea.    RECURRENT DIVERTICULITIS:   Monitor the patient's history of recurrent diverticulitis episodes, noting that there have been none recently.   Assess the missed colonoscopy appointment and discuss rescheduling.    HISTORY OF ANEMIA:   Assess the patient's history of anemia with improving hemoglobin levels.   Order fasting labs including iron levels and hemoglobin check.   Consider further investigation if abnormalities persist.    FAMILY HISTORY OF SKIN CANCER:   Assess the patient's personal history of skin lesion removal and family history of possible skin cancer in grandfather.   Discuss the significance of regular skin cancer screenings and refer the patient to dermatology for follow-up skin cancer screening.    FOLLOW-UP:   Schedule follow-up visit after completing ordered labs and referral appointments.         __________________________    Shaina Walters MD, PharmD  [facility]  American Board of Obesity Medicine diplomate  Office [phone]             [1]   Current Outpatient Medications:     buprenorphine-naloxone 8-2 mg (SUBOXONE) 8-2 mg, Place under the tongue., Disp: , Rfl:     diclofenac (VOLTAREN) 75 MG EC tablet, Take 1 tablet (75 mg total) by mouth 2 (two) times  daily as needed., Disp: 40 tablet, Rfl: 0    famotidine (PEPCID) 40 MG tablet, Take 1 tablet (40 mg total) by mouth once daily., Disp: 90 tablet, Rfl: 3    omeprazole (PRILOSEC OTC) 20 MG tablet, Take 1 tablet (20 mg total) by mouth once daily., Disp: 30 tablet, Rfl: 0

## 2025-04-16 ENCOUNTER — OFFICE VISIT (OUTPATIENT)
Dept: SLEEP MEDICINE | Facility: CLINIC | Age: 40
End: 2025-04-16
Attending: PSYCHIATRY & NEUROLOGY
Payer: COMMERCIAL

## 2025-04-16 VITALS
BODY MASS INDEX: 40.6 KG/M2 | WEIGHT: 267.88 LBS | HEIGHT: 68 IN | SYSTOLIC BLOOD PRESSURE: 148 MMHG | DIASTOLIC BLOOD PRESSURE: 92 MMHG | HEART RATE: 73 BPM

## 2025-04-16 DIAGNOSIS — G47.30 SLEEP APNEA, UNSPECIFIED TYPE: Primary | ICD-10-CM

## 2025-04-16 DIAGNOSIS — Z91.89 AT RISK FOR SLEEP APNEA: ICD-10-CM

## 2025-04-16 PROCEDURE — 3008F BODY MASS INDEX DOCD: CPT | Mod: CPTII,S$GLB,, | Performed by: PSYCHIATRY & NEUROLOGY

## 2025-04-16 PROCEDURE — 3077F SYST BP >= 140 MM HG: CPT | Mod: CPTII,S$GLB,, | Performed by: PSYCHIATRY & NEUROLOGY

## 2025-04-16 PROCEDURE — 3044F HG A1C LEVEL LT 7.0%: CPT | Mod: CPTII,S$GLB,, | Performed by: PSYCHIATRY & NEUROLOGY

## 2025-04-16 PROCEDURE — 3080F DIAST BP >= 90 MM HG: CPT | Mod: CPTII,S$GLB,, | Performed by: PSYCHIATRY & NEUROLOGY

## 2025-04-16 PROCEDURE — 99204 OFFICE O/P NEW MOD 45 MIN: CPT | Mod: S$GLB,,, | Performed by: PSYCHIATRY & NEUROLOGY

## 2025-04-16 PROCEDURE — 99999 PR PBB SHADOW E&M-EST. PATIENT-LVL III: CPT | Mod: PBBFAC,,, | Performed by: PSYCHIATRY & NEUROLOGY

## 2025-04-16 NOTE — PROGRESS NOTES
Luca Story is a 39 y.o. male is here to be evaluated for a sleep disorder; referred by Shaina Walters MD.    He is mostly here due to loud snoring affecting his wife's sleep.    The patient reports snoring, gasping for air, excessive daytime sleepiness, and excessive daytime fatigue since several years ago. Drinks a lot of energy caseinate drinks through out the day.  Snoring for many years. No clear choking, gasping or witnessed apneas.     Wearing OA (oral appliance) for snoring with no relief.  Denied tooth grinding. Sometimes he feels his jaw is falling off the OA (oral appliance) for KAYKAY    Can sleep anywhere any time when  not engaged - thinks its since the time he was working different hours in the past and had to sleep when he could.      Feeling tired in AM; at times dry mouth in Am from mouth piece   Luca Story denied  gasping for air, choking , and witnessed apneas.    The patient feels rested upon awakening. Takes naps.     The patient  denies morning headaches and denies  dry mouth on awakening.   Luca Story denies  nasal congestion.      The patient  denied difficulty  with falling or staying asleep.    Luca Story  denies symptoms concerning for parasomnia except for occasional somniloquy.  The patient  denies auxiliary symptoms of narcolepsy including sleep onset paralysis, hypnagogic hallucinations, sleep attacks and cataplexy.    Luca Story denied symptoms concerning for RLS; nocturnal leg movements have not been noticed.   The patient does not experience sleep related leg cramps.           Medications pertinent to sleep  disorders taken currently: -  Previous  medications taken  for sleep disorders:  -    Sleep studies  no        Occupation:runs a bar now ; used to work more nights in the past.  Bed partner: his wife affected by his snoring  Exercise routine: active   Caffeine:  through the day  beverages per day        4/16/2025     7:58 AM   TANI  SLEEPINESS SCALE TOTAL SCORE    Total score 13        Patient-reported             4/16/2025     7:58 AM   EPWORTH SLEEPINESS SCALE   Sitting and reading 3   Watching TV 2   Sitting, inactive in a public place (e.g. a theatre or a meeting) 0   As a passenger in a car for an hour without a break 3   Lying down to rest in the afternoon when circumstances permit 3   Sitting and talking to someone 0   Sitting quietly after a lunch without alcohol 2   In a car, while stopped for a few minutes in traffic 0   Total score 13        Patient-reported           4/9/2025   PHQ-9 Depression Patient Health Questionnaire   Over the last two weeks how often have you been bothered by little interest or pleasure in doing things 0   Over the last two weeks how often have you been bothered by feeling down, depressed or hopeless 0           No data to display                    4/16/2025     7:58 AM   EPWORTH SLEEPINESS SCALE   Sitting and reading 3   Watching TV 2   Sitting, inactive in a public place (e.g. a theatre or a meeting) 0   As a passenger in a car for an hour without a break 3   Lying down to rest in the afternoon when circumstances permit 3   Sitting and talking to someone 0   Sitting quietly after a lunch without alcohol 2   In a car, while stopped for a few minutes in traffic 0   Total score 13        Patient-reported         4/16/2025     8:04 AM   Sleep Clinic New Patient   What time do you go to bed on a week day? (Give a range) 9pm-11pm   What time do you go to bed on a day off? (Give a range) 11pm-2am   How long does it take you to fall asleep? (Give a range) 1 minute   On average, how many times per night do you wake up? 1-10 depends on the night, dont know why   How long does it take you to fall back into sleep? (Give a range) 1 minute   What time do you wake up to start your day on a week day? (Give a range) 5:45am   What time do you wake up to start your day on a day off? (Give a range) 6:45am   What time do you  get out of bed? (Give a range) 5:45am-6:45am   On average, how many hours do you sleep? 6-7 hours   On average, how many naps do you take per day? 1 if im john   Rate your sleep quality from 0 to 5 (0-poor, 5-great). 4   Have you experienced:  N/a   How much weight have you lost or gained (in lbs.) in the last year? 0   On average, how many times per night do you go to the bathroom?  1-4   Have you ever had a sleep study/CPAP machine/surgery for sleep apnea? No   Have you ever had a CPAP machine for sleep apnea? No   Have you ever had surgery for sleep apnea? No           4/16/2025     8:04 AM   Sleep Clinic ROS    Difficulty breathing through the nose?  No   Sore throat or dry mouth in the morning? No   Irregular or very fast heart beat?  No   Shortness of breath?  Sometimes   Acid reflux? Yes   Body aches and pains?  Yes   Morning headaches? Sometimes   Dizziness? No   Mood changes?  No   Do you exercise?  No   Do you feel like moving your legs a lot?  No       DME:         PAST MEDICAL HISTORY:    Active Ambulatory Problems     Diagnosis Date Noted    Anemia 07/16/2018    GERD (gastroesophageal reflux disease) 08/01/2018    Uveitis 08/06/2018    Hepatosplenomegaly 08/22/2018    Morbid obesity with BMI of 40.0-44.9, adult 11/08/2023    Opioid dependence in remission 04/09/2025    Elevated blood pressure reading 04/09/2025    Prediabetes 04/09/2025    H/O basal cell carcinoma excision 04/09/2025     Resolved Ambulatory Problems     Diagnosis Date Noted    Diverticulitis 11/19/2012    Leukocytosis 07/16/2018    Swelling of extremity 07/27/2018    Bilateral foot pain 08/06/2018    Swelling of both hands 08/06/2018    Monocytosis 08/26/2018    Thrombocytosis 08/26/2018     Past Medical History:   Diagnosis Date    Basal cell carcinoma                 PAST SURGICAL HISTORY:    Past Surgical History:   Procedure Laterality Date    COLONOSCOPY N/A 8/1/2018    Procedure: COLONOSCOPY;  Surgeon: Alex Jackson MD;   "Location: [facility] ([address] [place] [address]);  Service: Endoscopy;  Laterality: N/A;    ESOPHAGOGASTRODUODENOSCOPY N/A 8/1/2018    Procedure: EGD (ESOPHAGOGASTRODUODENOSCOPY);  Surgeon: Alex Jackson MD;  [facility]  Service: Endoscopy;  Laterality: N/A;  Case said first available; Pt okay with f/a; Dr. Garcia's next available is 8/25, pt wanting sooner    KNEE ARTHROSCOPY Right     KNEE SURGERY Left     ACL         FAMILY HISTORY:                Family History   Problem Relation Name Age of Onset    Rheum arthritis Mother      Diabetes Maternal Grandmother      Cancer Maternal Grandmother      Melanoma Maternal Grandfather      Cirrhosis Maternal Uncle      Crohn's disease Neg Hx      Ulcerative colitis Neg Hx      Celiac disease Neg Hx         SOCIAL HISTORY:          Tobacco: Tobacco Use History[1]    alcohol use:    Social History     Substance and Sexual Activity   Alcohol Use No                   ALLERGIES:  Review of patient's allergies indicates:  No Known Allergies    CURRENT MEDICATIONS:  Current Medications[2]                   REVIEW OF SYSTEMS:   Sleep related symptoms as per HPI      Otherwise, a balance of 10 systems reviewed is negative.    PHYSICAL EXAM:  BP (!) 148/92 (BP Location: Left arm, Patient Position: Sitting)   Pulse 73   Ht 5' 8" (1.727 m)   Wt 121.5 kg (267 lb 13.7 oz)   BMI 40.73 kg/m²       GENERAL: Normal development,. Well-appearing.  HEENT:  ; Nose is symmetrical;  Modified Mallampati:II-III; Posterior palate is normal;   SKIN: No rash on the face or bridge of the nose   NECK: Neck circumference is 17.5 inches.   PSYCH: Affect is full. Mood is normal        ASSESSMENT:    1. Sleep Apnea NEC. The patient symptomatically has  snoring, gasping for air, and excessive daytime sleepiness  with exam findings of crowded airway and elevated BMI. The patient has medical co-morbidities of GI Reflux, impaired glucose tolerance, and obesity  which can be worsened by KAYKAY. This " warrants further investigation for possible obstructive sleep apnea.                        Assessment & Plan  At risk for sleep apnea    Orders:    Ambulatory referral/consult to Sleep Disorders    Sleep apnea, unspecified type    Orders:    Home Sleep Study; Future        PLAN:    Diagnostic: Home Sleep Study - 1 night wo OA and second night with OA (oral appliance) for KAYKAY for snoring  . The nature of this procedure and its indication was discussed with the patient. We will notify the patient about sleep study resuts via My Chart.        If HST fails will do PSG for confirmation        During our discussion today, we talked about the etiology of  KAYKAY as well as the potential ramifications of untreated sleep apnea, which could include daytime sleepiness, hypertension, heart disease and/or stroke.  We discussed potential treatment options, which could include weight loss, body positioning, continuous positive airway pressure (CPAP), or referral for surgical consideration. Meanwhile, he  is urged to avoid supine sleep, weight gain and alcoholic beverages since all of these can worsen KAYKAY.     The patient was given open opportunity to ask questions and/or express concerns about treatment plan. Two point patient identifier confirmed.       Precautions: The patient was advised to abstain from driving should he feel sleepy or drowsy.    Follow up: MD after the sleep study has been completed.     ESS ([place] Sleepiness Scale) and other sleep medicine related questionnaires were reviewed with the patient.      46-minute visit. >50% spent counseling patient and coordination of care.  The patient was  cautioned against drowsy driving.                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      famotidine (PEPCID) 40 MG tablet Take 1 tablet (40 mg total) by mouth once daily. 90 tablet 3    omeprazole (PRILOSEC OTC) 20 MG tablet Take 1 tablet (20 mg total) by mouth once daily. 30 tablet 0     No current facility-administered medications for this visit.

## 2025-05-28 ENCOUNTER — PATIENT MESSAGE (OUTPATIENT)
Dept: SLEEP MEDICINE | Facility: CLINIC | Age: 40
End: 2025-05-28
Payer: COMMERCIAL

## 2025-05-28 ENCOUNTER — PATIENT MESSAGE (OUTPATIENT)
Dept: INTERNAL MEDICINE | Facility: CLINIC | Age: 40
End: 2025-05-28
Payer: COMMERCIAL